# Patient Record
Sex: MALE | Race: WHITE | NOT HISPANIC OR LATINO | Employment: FULL TIME | URBAN - NONMETROPOLITAN AREA
[De-identification: names, ages, dates, MRNs, and addresses within clinical notes are randomized per-mention and may not be internally consistent; named-entity substitution may affect disease eponyms.]

---

## 2019-01-21 ENCOUNTER — OFFICE VISIT (OUTPATIENT)
Dept: CARDIOLOGY | Facility: CLINIC | Age: 44
End: 2019-01-21

## 2019-01-21 VITALS
HEART RATE: 72 BPM | SYSTOLIC BLOOD PRESSURE: 159 MMHG | RESPIRATION RATE: 16 BRPM | WEIGHT: 224 LBS | BODY MASS INDEX: 32.07 KG/M2 | HEIGHT: 70 IN | DIASTOLIC BLOOD PRESSURE: 93 MMHG

## 2019-01-21 DIAGNOSIS — R07.2 PRECORDIAL PAIN: Primary | ICD-10-CM

## 2019-01-21 DIAGNOSIS — I10 ESSENTIAL HYPERTENSION: ICD-10-CM

## 2019-01-21 PROCEDURE — 93000 ELECTROCARDIOGRAM COMPLETE: CPT | Performed by: INTERNAL MEDICINE

## 2019-01-21 PROCEDURE — 99204 OFFICE O/P NEW MOD 45 MIN: CPT | Performed by: INTERNAL MEDICINE

## 2019-01-21 RX ORDER — AMLODIPINE BESYLATE 5 MG/1
5 TABLET ORAL DAILY
Qty: 30 TABLET | Refills: 3 | Status: SHIPPED | OUTPATIENT
Start: 2019-01-21 | End: 2019-02-21 | Stop reason: SINTOL

## 2019-01-21 RX ORDER — NITROGLYCERIN 0.4 MG/1
TABLET SUBLINGUAL
Qty: 25 TABLET | Refills: 3 | Status: SHIPPED | OUTPATIENT
Start: 2019-01-21 | End: 2020-08-24 | Stop reason: SDUPTHER

## 2019-01-21 NOTE — PROGRESS NOTES
"Aamir Wilder DO  Sahil Barnes  1975 01/21/2019    Patient Active Problem List   Diagnosis   • Precordial pain   • Essential hypertension       Dear  :    Cinthya Barnes is a 43 y.o. male with the problems as listed above, presents    Chief complaint: Recurrent chest pains and left arm pains.    History of Present Illness: Mr. Barnes is a pleasant 42-year-old  male with no history of known coronary artery disease but has a positive family history with his father having had coronary artery disease at relatively young age, has hypertension but apparently not taking medications, nonsmoker nondiabetic, presents with complains of having recurrent chest pains over the last month or so. About a month ago he developed left upper chest pain which radiated to his left arm with severe pain in his left arm and felt like \"my arm is going to come off\".  This apparently lasted for about 30 minutes and was associated with profuse sweating.  Then it resolved spontaneously.  He did not seek any medical attention at that time.  Since then he has had couple of episodes of less intense pain and all these episodes occurred at night when he was sleeping.  He denies any chest painor arm pains during the daytime when he is working.  He works as a .  He denies any significant dyspnea, PND, orthopnea or pedal edema.  He has history of diverticulitis with some intermittent lower GI bleeding.    Cardiac risk factors:Positive family Hx. of premature athersclerotivc disease. and Age and male gender.    No Known Allergies:      Current Outpatient Medications:   •  amLODIPine (NORVASC) 5 MG tablet, Take 1 tablet by mouth Daily., Disp: 30 tablet, Rfl: 3  •  aspirin 81 MG tablet, Take 1 tablet by mouth Daily., Disp: 30 tablet, Rfl: 3  •  metoprolol tartrate (LOPRESSOR) 25 MG tablet, Take 1 tablet by mouth 2 (Two) Times a Day., Disp: 60 tablet, Rfl: 3  •  nitroglycerin (NITROSTAT) 0.4 MG SL tablet, 1 " "under the tongue as needed for angina, may repeat q5mins for up three doses, Disp: 25 tablet, Rfl: 3    Past Medical History:   Diagnosis Date   • Hypertension      Past Surgical History:   Procedure Laterality Date   • KNEE SURGERY       Family History   Problem Relation Age of Onset   • Heart attack Father    • Heart disease Father      Social History     Tobacco Use   • Smoking status: Never Smoker   • Smokeless tobacco: Current User     Types: Chew   Substance Use Topics   • Alcohol use: No     Frequency: Never   • Drug use: No       Review of Systems   Constitution: Positive for weight gain.   HENT: Positive for hearing loss.    Cardiovascular: Positive for chest pain and palpitations. Negative for leg swelling.   Respiratory: Negative for shortness of breath.    Skin: Positive for dry skin, itching and rash.   Musculoskeletal: Positive for joint pain.   Gastrointestinal: Positive for abdominal pain, constipation, diarrhea and hematochezia.        Diverticulitis   Genitourinary:        Hx renal stones   Psychiatric/Behavioral: The patient has insomnia.        Objective   Blood pressure 159/93, pulse 72, resp. rate 16, height 177.8 cm (70\"), weight 102 kg (224 lb).  Body mass index is 32.14 kg/m².        Physical Exam   Constitutional: He is oriented to person, place, and time. He appears well-developed and well-nourished.   HENT:   Mouth/Throat: Oropharynx is clear and moist.   Eyes: EOM are normal. Pupils are equal, round, and reactive to light.   Neck: Neck supple. No JVD present. No tracheal deviation present. No thyromegaly present.   Cardiovascular: Normal rate, regular rhythm, S1 normal and S2 normal. Exam reveals no gallop, no S3, no S4 and no friction rub.   No murmur heard.  Pulses:       Dorsalis pedis pulses are 1+ on the right side, and 1+ on the left side.        Posterior tibial pulses are 2+ on the right side, and 2+ on the left side.   Pulmonary/Chest: Effort normal and breath sounds normal. "   Abdominal: Soft. Bowel sounds are normal. He exhibits no mass. There is no tenderness.   Musculoskeletal: Normal range of motion. He exhibits no edema.   Lymphadenopathy:     He has no cervical adenopathy.   Neurological: He is alert and oriented to person, place, and time.   Skin: Skin is warm and dry. No rash noted.   Psychiatric: He has a normal mood and affect.       Lab Results   Component Value Date     12/21/2016    K 3.9 12/21/2016     12/21/2016    CO2 29.5 12/21/2016    BUN 6 (L) 12/21/2016    CREATININE 0.85 12/21/2016    GLUCOSE 89 12/21/2016    CALCIUM 9.2 12/21/2016    AST 15 12/21/2016    ALT 30 12/21/2016    ALKPHOS 87 12/21/2016     No results found for: CKTOTAL  Lab Results   Component Value Date    WBC 9.48 12/21/2016    HGB 15.5 12/21/2016    HCT 45.7 12/21/2016     12/21/2016           ECG 12 Lead  Date/Time: 1/21/2019 10:33 AM  Performed by: Collin Cook MD  Authorized by: Collin Cook MD   Rhythm: sinus rhythm  BPM: 71  Conduction: conduction normal  ST Segments: ST segments normal  T Waves: T waves normal  Clinical impression: normal ECG              Assessment/Plan    Diagnosis Plan   1. Precordial pain  Adult Stress Echo   2. Essential hypertension with elevated blood pressures.         Recommendations:  1. Add aspirin 81 mg daily and metoprolol 25 mg by mouth twice a day.  2. Add amlodipine 5 mg daily in the evening.  3. Evaluate his chest pains further with a stress echo study.    Return in about 3 weeks (around 2/11/2019).    As always,  Aamir   I appreciate very much the opportunity to participate in the cardiovascular care of your patients. Please do not hesitate to call me with any questions with regards to Sahil Barnes's evaluation and management.       With Best Regards,        Collin Cook MD, [facility]    Dragon disclaimer:  Much of this encounter note is an electronic transcription/translation of spoken language to printed text. The electronic  translation of spoken language may permit erroneous, or at times, nonsensical words or phrases to be inadvertently transcribed; Although I have reviewed the note for such errors, some may still exist.

## 2019-01-29 ENCOUNTER — HOSPITAL ENCOUNTER (OUTPATIENT)
Dept: CARDIOLOGY | Facility: HOSPITAL | Age: 44
Discharge: HOME | End: 2019-01-29
Attending: INTERNAL MEDICINE | Admitting: INTERNAL MEDICINE

## 2019-01-29 DIAGNOSIS — R07.2 PRECORDIAL PAIN: ICD-10-CM

## 2019-01-29 PROCEDURE — 93018 CV STRESS TEST I&R ONLY: CPT | Performed by: INTERNAL MEDICINE

## 2019-01-29 PROCEDURE — 93350 STRESS TTE ONLY: CPT | Performed by: INTERNAL MEDICINE

## 2019-01-29 PROCEDURE — 93320 DOPPLER ECHO COMPLETE: CPT

## 2019-01-29 PROCEDURE — 93325 DOPPLER ECHO COLOR FLOW MAPG: CPT | Performed by: INTERNAL MEDICINE

## 2019-01-29 PROCEDURE — 93351 STRESS TTE COMPLETE: CPT

## 2019-01-29 PROCEDURE — 93325 DOPPLER ECHO COLOR FLOW MAPG: CPT

## 2019-01-29 PROCEDURE — 93320 DOPPLER ECHO COMPLETE: CPT | Performed by: INTERNAL MEDICINE

## 2019-01-30 LAB
BH CV ECHO MEAS - % IVS THICK: 30.3 %
BH CV ECHO MEAS - % LVPW THICK: 74.3 %
BH CV ECHO MEAS - ACS: 2.2 CM
BH CV ECHO MEAS - AO MAX PG: 7.1 MMHG
BH CV ECHO MEAS - AO MEAN PG: 3.1 MMHG
BH CV ECHO MEAS - AO ROOT AREA: 6.9 CM^2
BH CV ECHO MEAS - AO ROOT DIAM: 3 CM
BH CV ECHO MEAS - AO V2 MAX: 133 CM/SEC
BH CV ECHO MEAS - AO V2 MEAN: 80.3 CM/SEC
BH CV ECHO MEAS - AO V2 VTI: 29.7 CM
BH CV ECHO MEAS - EDV(CUBED): 103.8 ML
BH CV ECHO MEAS - EDV(MOD-SP4): 80 ML
BH CV ECHO MEAS - EDV(TEICH): 102.4 ML
BH CV ECHO MEAS - EF(CUBED): 62.5 %
BH CV ECHO MEAS - EF(MOD-SP4): 56.3 %
BH CV ECHO MEAS - EF(TEICH): 54 %
BH CV ECHO MEAS - ESV(CUBED): 39 ML
BH CV ECHO MEAS - ESV(MOD-SP4): 35 ML
BH CV ECHO MEAS - ESV(TEICH): 47.1 ML
BH CV ECHO MEAS - FS: 27.9 %
BH CV ECHO MEAS - IVS/LVPW: 1.1
BH CV ECHO MEAS - IVSD: 1 CM
BH CV ECHO MEAS - IVSS: 1.3 CM
BH CV ECHO MEAS - LA DIMENSION: 3.8 CM
BH CV ECHO MEAS - LA/AO: 1.3
BH CV ECHO MEAS - LV MASS(C)D: 160 GRAMS
BH CV ECHO MEAS - LV MASS(C)S: 182.2 GRAMS
BH CV ECHO MEAS - LVIDD: 4.7 CM
BH CV ECHO MEAS - LVIDS: 3.4 CM
BH CV ECHO MEAS - LVLD AP4: 7.7 CM
BH CV ECHO MEAS - LVLS AP4: 6.5 CM
BH CV ECHO MEAS - LVOT AREA (M): 3.5 CM^2
BH CV ECHO MEAS - LVOT AREA: 3.4 CM^2
BH CV ECHO MEAS - LVOT DIAM: 2.1 CM
BH CV ECHO MEAS - LVPWD: 0.95 CM
BH CV ECHO MEAS - LVPWS: 1.6 CM
BH CV ECHO MEAS - MV A MAX VEL: 50.8 CM/SEC
BH CV ECHO MEAS - MV E MAX VEL: 80.9 CM/SEC
BH CV ECHO MEAS - MV E/A: 1.6
BH CV ECHO MEAS - PA ACC SLOPE: 1590 CM/SEC^2
BH CV ECHO MEAS - PA ACC TIME: 0.08 SEC
BH CV ECHO MEAS - PA PR(ACCEL): 41 MMHG
BH CV ECHO MEAS - RAP SYSTOLE: 10 MMHG
BH CV ECHO MEAS - RVSP: 36.1 MMHG
BH CV ECHO MEAS - SV(AO): 203.4 ML
BH CV ECHO MEAS - SV(CUBED): 64.9 ML
BH CV ECHO MEAS - SV(MOD-SP4): 45 ML
BH CV ECHO MEAS - SV(TEICH): 55.3 ML
BH CV ECHO MEAS - TR MAX VEL: 255.6 CM/SEC
BH CV STRESS BP STAGE 1: NORMAL
BH CV STRESS BP STAGE 2: NORMAL
BH CV STRESS BP STAGE 3: NORMAL
BH CV STRESS BP STAGE 4: NORMAL
BH CV STRESS DURATION MIN STAGE 1: 3
BH CV STRESS DURATION MIN STAGE 2: 3
BH CV STRESS DURATION MIN STAGE 3: 3
BH CV STRESS DURATION MIN STAGE 4: 3
BH CV STRESS DURATION SEC STAGE 1: 0
BH CV STRESS DURATION SEC STAGE 2: 0
BH CV STRESS DURATION SEC STAGE 3: 0
BH CV STRESS DURATION SEC STAGE 4: 0
BH CV STRESS ECHO POST STRESS EJECTION FRACTION EF: 70 %
BH CV STRESS GRADE STAGE 1: 10
BH CV STRESS GRADE STAGE 2: 12
BH CV STRESS GRADE STAGE 3: 14
BH CV STRESS GRADE STAGE 4: 16
BH CV STRESS HR STAGE 1: 106
BH CV STRESS HR STAGE 2: 120
BH CV STRESS HR STAGE 3: 138
BH CV STRESS HR STAGE 4: 159
BH CV STRESS METS STAGE 1: 5
BH CV STRESS METS STAGE 2: 7.5
BH CV STRESS METS STAGE 3: 10
BH CV STRESS METS STAGE 4: 13.5
BH CV STRESS PROTOCOL 1: NORMAL
BH CV STRESS RECOVERY BP: NORMAL MMHG
BH CV STRESS RECOVERY HR: 89 BPM
BH CV STRESS SPEED STAGE 1: 1.7
BH CV STRESS SPEED STAGE 2: 2.5
BH CV STRESS SPEED STAGE 3: 3.4
BH CV STRESS SPEED STAGE 4: 4.2
BH CV STRESS STAGE 1: 1
BH CV STRESS STAGE 2: 2
BH CV STRESS STAGE 3: 3
BH CV STRESS STAGE 4: 4
MAXIMAL PREDICTED HEART RATE: 175 BPM
PERCENT MAX PREDICTED HR: 90.86 %
STRESS BASELINE BP: NORMAL MMHG
STRESS BASELINE HR: 76 BPM
STRESS PERCENT HR: 107 %
STRESS POST ESTIMATED WORKLOAD: 12.8 METS
STRESS POST EXERCISE DUR MIN: 10 MIN
STRESS POST EXERCISE DUR SEC: 0 SEC
STRESS POST PEAK BP: NORMAL MMHG
STRESS POST PEAK HR: 159 BPM
STRESS TARGET HR: 149 BPM

## 2019-02-21 ENCOUNTER — OFFICE VISIT (OUTPATIENT)
Dept: CARDIOLOGY | Facility: CLINIC | Age: 44
End: 2019-02-21

## 2019-02-21 VITALS
DIASTOLIC BLOOD PRESSURE: 85 MMHG | HEIGHT: 70 IN | BODY MASS INDEX: 32.93 KG/M2 | WEIGHT: 230 LBS | OXYGEN SATURATION: 98 % | HEART RATE: 76 BPM | SYSTOLIC BLOOD PRESSURE: 145 MMHG | RESPIRATION RATE: 18 BRPM

## 2019-02-21 DIAGNOSIS — I10 ESSENTIAL HYPERTENSION: Primary | ICD-10-CM

## 2019-02-21 PROCEDURE — 99213 OFFICE O/P EST LOW 20 MIN: CPT | Performed by: PHYSICIAN ASSISTANT

## 2019-02-21 NOTE — PROGRESS NOTES
Aamir Wilder DO  Sahil Barnes  1975 02/21/2019    Patient Active Problem List   Diagnosis   • Precordial pain   • Essential hypertension       Dear Aamir Wilder DO:    Subjective       History of Present Illness:    Chief Complaint   Patient presents with   • Review test results     Stress test done on 1/21/2019       Sahil Barnes is a pleasant 43 y.o. male with a past medical history significant for no history of known coronary artery disease but has a positive family history with his father having had coronary artery disease at relatively young age, has hypertension but apparently not taking medications, nonsmoker nondiabetic. He comes in today to discuss results of stress echo.     Patient states since starting on amlodipine and Lopressor he has been having leg pains with leg swelling.  His stress echo revealed normal pump function with no significant valvular disease and stress portion showed no signs of ischemia.    He does report of this test was performed because of chest pains and is having in his left shoulder that he felt like was getting hold off however since being initiated on these medications he has not had any more episodes of the chest pain.        Stress echo on 1/30/2019  Interpretation Summary   Echocardiogram Findings:   Normal left ventricular cavity size and wall thickness noted. All left ventricular wall segments contract normally.   Estimated EF appears to be in the range of 61 - 65%   The aortic valve is structurally normal. No aortic valve regurgitation is present. No aortic valve stenosis is present.   The mitral valve is normal in structure. Mild mitral valve regurgitation is present. No significant mitral valve stenosis is present.   The tricuspid valve is normal. No tricuspid valve stenosis is present. Mild tricuspid valve regurgitation is present. Estimated right ventricular systolic pressure from tricuspid regurgitation is normal (<35 mmHg).   There is no evidence of  pericardial effusion.   Stress Procedure:   A stress test was performed following the Allan protocol.   Exercise duration (min) 10 min Exercise duration (sec) 0 sec Estimated workload 12.8 METS   Baseline Vitals Baseline HR 76 bpm Baseline /96 mmHg Peak Stress Vitals Peak  bpm Peak /68 mmHg Recovery Vitals Recovery HR 89 bpm Recovery /64 mmHg Exercise Data Target HR (85%) 149 bpm Max. Pred. HR (100%) 175 bpm Percent Max Pred HR 90.86 %   There was no ST segment deviation noted during stress.   Arrhythmias during stress: occasional PVCs.   Normal ECG stress ECG interpretation.   Stress Echo Findings:   Segment augmentation had a normal response to stress.   Cavity size behaved normally in response to stress. Left ventricular function is normal. Septal wall motion is normal.   Post stress images with adequate visualization of myocardium to assess for ischemia   Normal stress echo with no significant echocardiographic evidence for myocardial ischemia.             No Known Allergies:      Current Outpatient Medications:   •  aspirin 81 MG tablet, Take 1 tablet by mouth Daily., Disp: 30 tablet, Rfl: 3  •  metoprolol tartrate (LOPRESSOR) 25 MG tablet, Take 1 tablet by mouth 2 (Two) Times a Day., Disp: 60 tablet, Rfl: 3  •  nitroglycerin (NITROSTAT) 0.4 MG SL tablet, 1 under the tongue as needed for angina, may repeat q5mins for up three doses, Disp: 25 tablet, Rfl: 3      The following portions of the patient's history were reviewed and updated as appropriate: allergies, current medications, past family history, past medical history, past social history, past surgical history and problem list.    Social History     Tobacco Use   • Smoking status: Never Smoker   • Smokeless tobacco: Current User     Types: Chew   Substance Use Topics   • Alcohol use: No     Frequency: Never   • Drug use: No       Review of Systems   Constitution: Negative for weakness and malaise/fatigue.   Cardiovascular: Negative  "for chest pain, dyspnea on exertion and irregular heartbeat.   Respiratory: Negative for cough and shortness of breath.    Hematologic/Lymphatic: Negative for bleeding problem. Does not bruise/bleed easily.   Gastrointestinal: Negative for nausea and vomiting.       Objective   Vitals:    02/21/19 0856   BP: 145/85   BP Location: Left arm   Patient Position: Sitting   Cuff Size: Adult   Pulse: 76   Resp: 18   SpO2: 98%   Weight: 104 kg (230 lb)   Height: 177.8 cm (70\")     Body mass index is 33 kg/m².        Physical Exam   Constitutional: He is oriented to person, place, and time. He appears well-developed and well-nourished. No distress.   HENT:   Head: Normocephalic and atraumatic.   Cardiovascular: Normal rate, regular rhythm, normal heart sounds and intact distal pulses.   Pulmonary/Chest: Effort normal and breath sounds normal. No respiratory distress.   Musculoskeletal: He exhibits no edema.   Neurological: He is alert and oriented to person, place, and time.   Skin: He is not diaphoretic.       Lab Results   Component Value Date     12/21/2016    K 3.9 12/21/2016     12/21/2016    CO2 29.5 12/21/2016    BUN 6 (L) 12/21/2016    CREATININE 0.85 12/21/2016    GLUCOSE 89 12/21/2016    CALCIUM 9.2 12/21/2016    AST 15 12/21/2016    ALT 30 12/21/2016    ALKPHOS 87 12/21/2016     No results found for: CKTOTAL  Lab Results   Component Value Date    WBC 9.48 12/21/2016    HGB 15.5 12/21/2016    HCT 45.7 12/21/2016     12/21/2016     No results found for: INR  No results found for: MG  No results found for: TSH, PSA, CHLPL, TRIG, HDL, LDL   No results found for: BNP    During this visit the following were done:  Labs Reviewed [x]    Labs Ordered []    Radiology Reports Reviewed [x]    Radiology Ordered []    PCP Records Reviewed []    Referring Provider Records Reviewed []    ER Records Reviewed []    Hospital Records Reviewed []    History Obtained From Family []    Radiology Images Reviewed []  "   Other Reviewed []    Records Requested []       Procedures      Assessment/Plan    Diagnosis Plan   1. Essential hypertension              Recommendations:  1. Will stop his amlodipine due to edema and leg pain. Advised to keep an eye on his blood pressure.   2. Reviewed his stress echo with him.   3. Continue lopressor.  4. Follow up in 3 months.     Return in about 3 months (around 5/21/2019).    As always, I appreciate very much the opportunity to participate in the cardiovascular care of your patients.      With Best Regards,    RORY Ny disclaimer:  Much of this encounter note is an electronic transcription/translation of spoken language to printed text. The electronic translation of spoken language may permit erroneous, or at times, nonsensical words or phrases to be inadvertently transcribed; Although I have reviewed the note for such errors, some may still exist.

## 2019-02-21 NOTE — PROGRESS NOTES
"Aamir Wilder DO  Sahil Barnes  1975 02/21/2019    Patient Active Problem List   Diagnosis   • Precordial pain   • Essential hypertension       Dear Aamir Wilder DO:    Subjective       History of Present Illness:    Chief Complaint   Patient presents with   • Review test results     Stress test done on 1/21/2019       Sahil Barnes is a pleasant 43 y.o. male with a past medical history significant for      No Known Allergies:      Current Outpatient Medications:   •  amLODIPine (NORVASC) 5 MG tablet, Take 1 tablet by mouth Daily., Disp: 30 tablet, Rfl: 3  •  aspirin 81 MG tablet, Take 1 tablet by mouth Daily., Disp: 30 tablet, Rfl: 3  •  metoprolol tartrate (LOPRESSOR) 25 MG tablet, Take 1 tablet by mouth 2 (Two) Times a Day., Disp: 60 tablet, Rfl: 3  •  nitroglycerin (NITROSTAT) 0.4 MG SL tablet, 1 under the tongue as needed for angina, may repeat q5mins for up three doses, Disp: 25 tablet, Rfl: 3      The following portions of the patient's history were reviewed and updated as appropriate: allergies, current medications, past family history, past medical history, past social history, past surgical history and problem list.    Social History     Tobacco Use   • Smoking status: Never Smoker   • Smokeless tobacco: Current User     Types: Chew   Substance Use Topics   • Alcohol use: No     Frequency: Never   • Drug use: No       ROS    Objective   Vitals:    02/21/19 0856   BP: 145/85   BP Location: Left arm   Patient Position: Sitting   Cuff Size: Adult   Pulse: 76   Resp: 18   SpO2: 98%   Weight: 104 kg (230 lb)   Height: 177.8 cm (70\")     Body mass index is 33 kg/m².        Physical Exam    Lab Results   Component Value Date     12/21/2016    K 3.9 12/21/2016     12/21/2016    CO2 29.5 12/21/2016    BUN 6 (L) 12/21/2016    CREATININE 0.85 12/21/2016    GLUCOSE 89 12/21/2016    CALCIUM 9.2 12/21/2016    AST 15 12/21/2016    ALT 30 12/21/2016    ALKPHOS 87 12/21/2016     No results found " for: CKTOTAL  Lab Results   Component Value Date    WBC 9.48 12/21/2016    HGB 15.5 12/21/2016    HCT 45.7 12/21/2016     12/21/2016     No results found for: INR  No results found for: MG  No results found for: TSH, PSA, CHLPL, TRIG, HDL, LDL   No results found for: BNP    During this visit the following were done:  Labs Reviewed [x]    Labs Ordered []    Radiology Reports Reviewed [x]    Radiology Ordered []    PCP Records Reviewed []    Referring Provider Records Reviewed []    ER Records Reviewed []    Hospital Records Reviewed []    History Obtained From Family []    Radiology Images Reviewed []    Other Reviewed []    Records Requested []       Procedures      Assessment/Plan    Diagnosis Plan   1. Essential hypertension                  Recommendations:  1.     Patient's Body mass index is 33 kg/m². BMI is {BMI range:98025}.       No Follow-up on file.    As always, I appreciate very much the opportunity to participate in the cardiovascular care of your patients.      With Best Regards,    RORY Ny disclaimer:  Much of this encounter note is an electronic transcription/translation of spoken language to printed text. The electronic translation of spoken language may permit erroneous, or at times, nonsensical words or phrases to be inadvertently transcribed; Although I have reviewed the note for such errors, some may still exist.

## 2019-06-26 ENCOUNTER — OFFICE VISIT (OUTPATIENT)
Dept: CARDIOLOGY | Facility: CLINIC | Age: 44
End: 2019-06-26

## 2019-06-26 VITALS
SYSTOLIC BLOOD PRESSURE: 145 MMHG | HEIGHT: 70 IN | BODY MASS INDEX: 33.21 KG/M2 | DIASTOLIC BLOOD PRESSURE: 91 MMHG | WEIGHT: 232 LBS | OXYGEN SATURATION: 97 % | HEART RATE: 65 BPM

## 2019-06-26 DIAGNOSIS — I10 ESSENTIAL HYPERTENSION: Primary | ICD-10-CM

## 2019-06-26 PROCEDURE — 99213 OFFICE O/P EST LOW 20 MIN: CPT | Performed by: PHYSICIAN ASSISTANT

## 2019-06-26 NOTE — PROGRESS NOTES
Aamir Wilder DO  Sahil Barnes  1975 06/26/2019    Patient Active Problem List   Diagnosis   • Precordial pain   • Essential hypertension       Dear Aamir Wilder DO:    Subjective     History of Present Illness:    Chief Complaint   Patient presents with   • Hypertension     Follow up, states B/P avtg 130's/70's   • Med Management     past wk traveling only took half dosage of Lopressor, d/c ASA due to side effect, pt feeling better       Sahil Barnes is a pleasant 43 y.o. male with a past medical history significant for history of precordial pain with a subsequent stress test being unremarkable and essential hypertension. He comes in for routine cardilogy follow up.     His blood pressure is slightly elevated today but he reports he checks it at home and it runs in the 130s mmhg systolic and he states that he ran our of his medication a few days ago and hasn't take any since. Patient denies any chest pain, shortness of breath, palpitations, dizziness, syncope or near syncope.     No Known Allergies:      Current Outpatient Medications:   •  metoprolol tartrate (LOPRESSOR) 25 MG tablet, Take 1 tablet by mouth 2 (Two) Times a Day., Disp: 120 tablet, Rfl: 6  •  nitroglycerin (NITROSTAT) 0.4 MG SL tablet, 1 under the tongue as needed for angina, may repeat q5mins for up three doses, Disp: 25 tablet, Rfl: 3    The following portions of the patient's history were reviewed and updated as appropriate: allergies, current medications, past family history, past medical history, past social history, past surgical history and problem list.    Social History     Tobacco Use   • Smoking status: Never Smoker   • Smokeless tobacco: Current User     Types: Chew   Substance Use Topics   • Alcohol use: No     Frequency: Never   • Drug use: No       Review of Systems   Constitution: Negative for weakness and malaise/fatigue.   Cardiovascular: Negative for chest pain, dyspnea on exertion and irregular heartbeat.  "  Respiratory: Negative for cough and shortness of breath.    Hematologic/Lymphatic: Negative for bleeding problem. Does not bruise/bleed easily.   Gastrointestinal: Negative for nausea and vomiting.     Objective   Vitals:    06/26/19 0945   BP: 145/91   BP Location: Left arm   Patient Position: Sitting   Cuff Size: Adult   Pulse: 65   SpO2: 97%   Weight: 105 kg (232 lb)   Height: 177.8 cm (70\")     Body mass index is 33.29 kg/m².    Physical Exam   Constitutional: He is oriented to person, place, and time. He appears well-developed and well-nourished. No distress.   HENT:   Head: Normocephalic and atraumatic.   Cardiovascular: Normal rate, regular rhythm and normal heart sounds.   Pulmonary/Chest: Effort normal and breath sounds normal. No respiratory distress.   Musculoskeletal: He exhibits no edema.   Neurological: He is alert and oriented to person, place, and time.   Skin: He is not diaphoretic.       Lab Results   Component Value Date     12/21/2016    K 3.9 12/21/2016     12/21/2016    CO2 29.5 12/21/2016    BUN 6 (L) 12/21/2016    CREATININE 0.85 12/21/2016    GLUCOSE 89 12/21/2016    CALCIUM 9.2 12/21/2016    AST 15 12/21/2016    ALT 30 12/21/2016    ALKPHOS 87 12/21/2016     No results found for: CKTOTAL  Lab Results   Component Value Date    WBC 9.48 12/21/2016    HGB 15.5 12/21/2016    HCT 45.7 12/21/2016     12/21/2016     During this visit the following were done:  Labs Reviewed [x]    Labs Ordered []    Radiology Reports Reviewed [x]    Radiology Ordered []    PCP Records Reviewed []    Referring Provider Records Reviewed []    ER Records Reviewed []    Hospital Records Reviewed []    History Obtained From Family []    Radiology Images Reviewed []    Other Reviewed []    Records Requested []       Procedures    Assessment/Plan    Diagnosis Plan   1. Essential hypertension  Lipid Panel    Comprehensive Metabolic Panel            Recommendations:  1. Patient is doing well from " cardiac standpoint. Since blood pressure is well controlled at home will not make adjustments today. Continue metoprolol.     Return in about 9 months (around 3/26/2020).    As always, I appreciate very much the opportunity to participate in the cardiovascular care of your patients.      With Best Regards,    Sundeep Caruso PA-C

## 2020-08-20 ENCOUNTER — TELEPHONE (OUTPATIENT)
Dept: CARDIOLOGY | Facility: CLINIC | Age: 45
End: 2020-08-20

## 2020-08-20 RX ORDER — NITROGLYCERIN 0.4 MG/1
TABLET SUBLINGUAL
Qty: 25 TABLET | Refills: 3 | OUTPATIENT
Start: 2020-08-20

## 2020-08-24 ENCOUNTER — OFFICE VISIT (OUTPATIENT)
Dept: CARDIOLOGY | Facility: CLINIC | Age: 45
End: 2020-08-24

## 2020-08-24 VITALS
SYSTOLIC BLOOD PRESSURE: 128 MMHG | DIASTOLIC BLOOD PRESSURE: 82 MMHG | HEART RATE: 57 BPM | WEIGHT: 233 LBS | TEMPERATURE: 98 F | HEIGHT: 70 IN | BODY MASS INDEX: 33.36 KG/M2 | RESPIRATION RATE: 16 BRPM

## 2020-08-24 DIAGNOSIS — I10 ESSENTIAL HYPERTENSION: Primary | ICD-10-CM

## 2020-08-24 PROCEDURE — 99213 OFFICE O/P EST LOW 20 MIN: CPT | Performed by: PHYSICIAN ASSISTANT

## 2020-08-24 PROCEDURE — 93000 ELECTROCARDIOGRAM COMPLETE: CPT | Performed by: PHYSICIAN ASSISTANT

## 2020-08-24 RX ORDER — NITROGLYCERIN 0.4 MG/1
TABLET SUBLINGUAL
Qty: 25 TABLET | Refills: 3 | Status: SHIPPED | OUTPATIENT
Start: 2020-08-24

## 2020-08-24 NOTE — PROGRESS NOTES
Aamir Wilder DO  Sahil Barnes  1975 08/24/2020    Patient Active Problem List   Diagnosis   • Precordial pain   • Essential hypertension       Dear Aamir Wilder DO:    Subjective     History of Present Illness:    Chief Complaint   Patient presents with   • Hypertension     1+ year follow   • Palpitations     some   • Med Management     verbal       Sahil Barnes is a pleasant 44 y.o. male with a past medical history significant for precordial pain with a subsequent stress test being unremarkable and essential hypertension. He comes in for routine cardilogy follow up.     Patient reports that he has been doing well. Patient denies any chest pain, shortness of breath, dizziness, syncope or near syncope. He also states that his blood pressure has been doing well too. He does report some palpitations but reports are mild and do not affect ADLs.     No Known Allergies:      Current Outpatient Medications:   •  metoprolol tartrate (LOPRESSOR) 25 MG tablet, Take 1 tablet by mouth 2 (Two) Times a Day., Disp: 180 tablet, Rfl: 2  •  nitroglycerin (NITROSTAT) 0.4 MG SL tablet, 1 under the tongue as needed for angina, may repeat q5mins for up three doses, Disp: 25 tablet, Rfl: 3    The following portions of the patient's history were reviewed and updated as appropriate: allergies, current medications, past family history, past medical history, past social history, past surgical history and problem list.    Social History     Tobacco Use   • Smoking status: Never Smoker   • Smokeless tobacco: Current User     Types: Chew   Substance Use Topics   • Alcohol use: No     Frequency: Never   • Drug use: No       Review of Systems   Constitution: Negative for malaise/fatigue.   Cardiovascular: Negative for chest pain, dyspnea on exertion, irregular heartbeat, leg swelling and palpitations.   Respiratory: Negative for cough and shortness of breath.    Hematologic/Lymphatic: Negative for bleeding problem. Does not  "bruise/bleed easily.   Gastrointestinal: Negative for nausea and vomiting.   Neurological: Negative for weakness.       Objective   Vitals:    08/24/20 0821   BP: 128/82   Pulse: 57   Resp: 16   Temp: 98 °F (36.7 °C)   Weight: 106 kg (233 lb)   Height: 177.8 cm (70\")     Body mass index is 33.43 kg/m².    Physical Exam   Constitutional: He is oriented to person, place, and time. He appears well-developed and well-nourished. No distress.   HENT:   Head: Normocephalic and atraumatic.   Cardiovascular: Normal rate, regular rhythm and normal heart sounds.   Pulmonary/Chest: Effort normal and breath sounds normal. No respiratory distress.   Musculoskeletal: He exhibits no edema.   Neurological: He is alert and oriented to person, place, and time.   Skin: He is not diaphoretic.       Lab Results   Component Value Date     12/21/2016    K 3.9 12/21/2016     12/21/2016    CO2 29.5 12/21/2016    BUN 6 (L) 12/21/2016    CREATININE 0.85 12/21/2016    GLUCOSE 89 12/21/2016    CALCIUM 9.2 12/21/2016    AST 15 12/21/2016    ALT 30 12/21/2016    ALKPHOS 87 12/21/2016     No results found for: CKTOTAL  Lab Results   Component Value Date    WBC 9.48 12/21/2016    HGB 15.5 12/21/2016    HCT 45.7 12/21/2016     12/21/2016     No results found for: INR  No results found for: MG  No results found for: TSH, PSA, CHLPL, TRIG, HDL, LDL   No results found for: BNP    During this visit the following were done:  Labs Reviewed [x]    Labs Ordered []    Radiology Reports Reviewed [x]    Radiology Ordered []    PCP Records Reviewed []    Referring Provider Records Reviewed []    ER Records Reviewed []    Hospital Records Reviewed []    History Obtained From Family []    Radiology Images Reviewed []    Other Reviewed []    Records Requested []         ECG 12 Lead  Date/Time: 8/24/2020 8:23 AM  Performed by: Sundeep Caruso PA-C  Authorized by: Sundeep Caruso PA-C   Comparison: compared with previous ECG   Similar to " previous ECG  Rhythm: sinus rhythm  ST Segments: ST segments normal    Clinical impression: normal ECG            Assessment/Plan    Diagnosis Plan   1. Essential hypertension              Recommendations:  1. Mr. Barnes appears to be doing well from cardiac standpoint. He is asymptomatic with good blood pressure control.  Will continue metoprolol.       Return in about 1 year (around 8/24/2021).    As always, I appreciate very much the opportunity to participate in the cardiovascular care of your patients.      With Best Regards,    Sundeep Caruso PA-C

## 2021-07-21 ENCOUNTER — HOSPITAL ENCOUNTER (OUTPATIENT)
Dept: ULTRASOUND IMAGING | Facility: HOSPITAL | Age: 46
Discharge: HOME | End: 2021-07-21
Admitting: FAMILY MEDICINE

## 2021-07-21 ENCOUNTER — TRANSCRIBE ORDERS (OUTPATIENT)
Dept: ADMINISTRATIVE | Facility: HOSPITAL | Age: 46
End: 2021-07-21

## 2021-07-21 DIAGNOSIS — R10.11 ABDOMINAL PAIN, RIGHT UPPER QUADRANT: ICD-10-CM

## 2021-07-21 DIAGNOSIS — R10.11 ABDOMINAL PAIN, RIGHT UPPER QUADRANT: Primary | ICD-10-CM

## 2021-07-21 PROCEDURE — 76705 ECHO EXAM OF ABDOMEN: CPT

## 2021-07-21 PROCEDURE — 76705 ECHO EXAM OF ABDOMEN: CPT | Performed by: RADIOLOGY

## 2021-07-22 ENCOUNTER — HOSPITAL ENCOUNTER (EMERGENCY)
Facility: HOSPITAL | Age: 46
Discharge: HOME | End: 2021-07-22
Attending: STUDENT IN AN ORGANIZED HEALTH CARE EDUCATION/TRAINING PROGRAM | Admitting: STUDENT IN AN ORGANIZED HEALTH CARE EDUCATION/TRAINING PROGRAM

## 2021-07-22 ENCOUNTER — APPOINTMENT (OUTPATIENT)
Dept: CT IMAGING | Facility: HOSPITAL | Age: 46
End: 2021-07-22

## 2021-07-22 ENCOUNTER — HOSPITAL ENCOUNTER (EMERGENCY)
Facility: HOSPITAL | Age: 46
End: 2021-07-22

## 2021-07-22 VITALS
TEMPERATURE: 97.8 F | SYSTOLIC BLOOD PRESSURE: 127 MMHG | HEART RATE: 62 BPM | DIASTOLIC BLOOD PRESSURE: 77 MMHG | BODY MASS INDEX: 31.5 KG/M2 | OXYGEN SATURATION: 100 % | RESPIRATION RATE: 18 BRPM | HEIGHT: 70 IN | WEIGHT: 220 LBS

## 2021-07-22 DIAGNOSIS — R10.13 EPIGASTRIC ABDOMINAL PAIN: Primary | ICD-10-CM

## 2021-07-22 LAB
ALBUMIN SERPL-MCNC: 4.32 G/DL (ref 3.5–5.2)
ALBUMIN/GLOB SERPL: 1.3 G/DL
ALP SERPL-CCNC: 93 U/L (ref 39–117)
ALT SERPL W P-5'-P-CCNC: 42 U/L (ref 1–41)
ANION GAP SERPL CALCULATED.3IONS-SCNC: 8.4 MMOL/L (ref 5–15)
AST SERPL-CCNC: 23 U/L (ref 1–40)
BASOPHILS # BLD AUTO: 0.05 10*3/MM3 (ref 0–0.2)
BASOPHILS NFR BLD AUTO: 0.8 % (ref 0–1.5)
BILIRUB SERPL-MCNC: 0.4 MG/DL (ref 0–1.2)
BUN SERPL-MCNC: 9 MG/DL (ref 6–20)
BUN/CREAT SERPL: 10.7 (ref 7–25)
CALCIUM SPEC-SCNC: 9 MG/DL (ref 8.6–10.5)
CHLORIDE SERPL-SCNC: 104 MMOL/L (ref 98–107)
CO2 SERPL-SCNC: 27.6 MMOL/L (ref 22–29)
CREAT SERPL-MCNC: 0.84 MG/DL (ref 0.76–1.27)
CRP SERPL-MCNC: <0.3 MG/DL (ref 0–0.5)
DEPRECATED RDW RBC AUTO: 42.1 FL (ref 37–54)
EOSINOPHIL # BLD AUTO: 0.06 10*3/MM3 (ref 0–0.4)
EOSINOPHIL NFR BLD AUTO: 0.9 % (ref 0.3–6.2)
ERYTHROCYTE [DISTWIDTH] IN BLOOD BY AUTOMATED COUNT: 12.4 % (ref 12.3–15.4)
GFR SERPL CREATININE-BSD FRML MDRD: 98 ML/MIN/1.73
GLOBULIN UR ELPH-MCNC: 3.3 GM/DL
GLUCOSE SERPL-MCNC: 98 MG/DL (ref 65–99)
HCT VFR BLD AUTO: 46.2 % (ref 37.5–51)
HGB BLD-MCNC: 15.8 G/DL (ref 13–17.7)
IMM GRANULOCYTES # BLD AUTO: 0.01 10*3/MM3 (ref 0–0.05)
IMM GRANULOCYTES NFR BLD AUTO: 0.2 % (ref 0–0.5)
LIPASE SERPL-CCNC: 36 U/L (ref 13–60)
LYMPHOCYTES # BLD AUTO: 2.26 10*3/MM3 (ref 0.7–3.1)
LYMPHOCYTES NFR BLD AUTO: 35 % (ref 19.6–45.3)
MCH RBC QN AUTO: 31.7 PG (ref 26.6–33)
MCHC RBC AUTO-ENTMCNC: 34.2 G/DL (ref 31.5–35.7)
MCV RBC AUTO: 92.8 FL (ref 79–97)
MONOCYTES # BLD AUTO: 0.7 10*3/MM3 (ref 0.1–0.9)
MONOCYTES NFR BLD AUTO: 10.9 % (ref 5–12)
NEUTROPHILS NFR BLD AUTO: 3.37 10*3/MM3 (ref 1.7–7)
NEUTROPHILS NFR BLD AUTO: 52.2 % (ref 42.7–76)
NRBC BLD AUTO-RTO: 0 /100 WBC (ref 0–0.2)
PLATELET # BLD AUTO: 209 10*3/MM3 (ref 140–450)
PMV BLD AUTO: 10.1 FL (ref 6–12)
POTASSIUM SERPL-SCNC: 3.9 MMOL/L (ref 3.5–5.2)
PROT SERPL-MCNC: 7.6 G/DL (ref 6–8.5)
RBC # BLD AUTO: 4.98 10*6/MM3 (ref 4.14–5.8)
SODIUM SERPL-SCNC: 140 MMOL/L (ref 136–145)
WBC # BLD AUTO: 6.45 10*3/MM3 (ref 3.4–10.8)

## 2021-07-22 PROCEDURE — 83690 ASSAY OF LIPASE: CPT | Performed by: PHYSICIAN ASSISTANT

## 2021-07-22 PROCEDURE — 85025 COMPLETE CBC W/AUTO DIFF WBC: CPT | Performed by: PHYSICIAN ASSISTANT

## 2021-07-22 PROCEDURE — 25010000002 IOPAMIDOL 61 % SOLUTION: Performed by: STUDENT IN AN ORGANIZED HEALTH CARE EDUCATION/TRAINING PROGRAM

## 2021-07-22 PROCEDURE — 25010000002 ONDANSETRON PER 1 MG: Performed by: PHYSICIAN ASSISTANT

## 2021-07-22 PROCEDURE — 99283 EMERGENCY DEPT VISIT LOW MDM: CPT

## 2021-07-22 PROCEDURE — 80053 COMPREHEN METABOLIC PANEL: CPT | Performed by: PHYSICIAN ASSISTANT

## 2021-07-22 PROCEDURE — 96374 THER/PROPH/DIAG INJ IV PUSH: CPT

## 2021-07-22 PROCEDURE — 86140 C-REACTIVE PROTEIN: CPT | Performed by: PHYSICIAN ASSISTANT

## 2021-07-22 PROCEDURE — 74177 CT ABD & PELVIS W/CONTRAST: CPT

## 2021-07-22 RX ORDER — ONDANSETRON 2 MG/ML
4 INJECTION INTRAMUSCULAR; INTRAVENOUS ONCE
Status: COMPLETED | OUTPATIENT
Start: 2021-07-22 | End: 2021-07-22

## 2021-07-22 RX ORDER — ONDANSETRON 4 MG/1
4 TABLET, ORALLY DISINTEGRATING ORAL EVERY 6 HOURS PRN
Qty: 20 TABLET | Refills: 0 | Status: SHIPPED | OUTPATIENT
Start: 2021-07-22 | End: 2022-10-06

## 2021-07-22 RX ORDER — SODIUM CHLORIDE 0.9 % (FLUSH) 0.9 %
10 SYRINGE (ML) INJECTION AS NEEDED
Status: DISCONTINUED | OUTPATIENT
Start: 2021-07-22 | End: 2021-07-22 | Stop reason: HOSPADM

## 2021-07-22 RX ADMIN — ONDANSETRON 4 MG: 2 INJECTION INTRAMUSCULAR; INTRAVENOUS at 06:03

## 2021-07-22 RX ADMIN — IOPAMIDOL 100 ML: 612 INJECTION, SOLUTION INTRAVENOUS at 07:44

## 2021-07-22 NOTE — ED PROVIDER NOTES
Subjective     History provided by:  Patient  Abdominal Pain  Pain location:  Epigastric  Pain quality: aching    Pain radiates to:  Does not radiate  Pain severity:  Moderate  Onset quality:  Sudden  Duration:  6 hours  Timing:  Constant  Progression:  Worsening  Chronicity:  Recurrent  Relieved by:  Nothing  Associated symptoms: nausea    Associated symptoms: no chest pain, no dysuria and no fever        Review of Systems   Constitutional: Negative.  Negative for fever.   HENT: Negative.    Respiratory: Negative.    Cardiovascular: Negative.  Negative for chest pain.   Gastrointestinal: Positive for abdominal pain and nausea.   Endocrine: Negative.    Genitourinary: Negative.  Negative for dysuria.   Skin: Negative.    Neurological: Negative.    Psychiatric/Behavioral: Negative.    All other systems reviewed and are negative.      Past Medical History:   Diagnosis Date   • Hypertension        No Known Allergies    Past Surgical History:   Procedure Laterality Date   • KNEE SURGERY         Family History   Problem Relation Age of Onset   • Heart attack Father    • Heart disease Father        Social History     Socioeconomic History   • Marital status:      Spouse name: Not on file   • Number of children: Not on file   • Years of education: Not on file   • Highest education level: Not on file   Tobacco Use   • Smoking status: Never Smoker   • Smokeless tobacco: Current User     Types: Chew   Substance and Sexual Activity   • Alcohol use: No   • Drug use: No   • Sexual activity: Yes     Partners: Female     Birth control/protection: None           Objective   Physical Exam  Vitals and nursing note reviewed.   Constitutional:       General: He is not in acute distress.     Appearance: He is well-developed. He is not diaphoretic.   HENT:      Head: Normocephalic and atraumatic.      Right Ear: External ear normal.      Left Ear: External ear normal.      Nose: Nose normal.   Eyes:      Conjunctiva/sclera:  Conjunctivae normal.   Neck:      Vascular: No JVD.      Trachea: No tracheal deviation.   Cardiovascular:      Rate and Rhythm: Normal rate.      Heart sounds: No murmur heard.     Pulmonary:      Effort: Pulmonary effort is normal. No respiratory distress.      Breath sounds: No wheezing.   Abdominal:      General: Bowel sounds are normal.      Palpations: Abdomen is soft.      Tenderness: There is abdominal tenderness in the right upper quadrant and epigastric area.   Musculoskeletal:         General: No deformity. Normal range of motion.      Cervical back: Normal range of motion and neck supple.   Skin:     General: Skin is warm and dry.      Coloration: Skin is not pale.      Findings: No erythema or rash.   Neurological:      Mental Status: He is alert and oriented to person, place, and time.      Cranial Nerves: No cranial nerve deficit.   Psychiatric:         Behavior: Behavior normal.         Thought Content: Thought content normal.         Procedures           ED Course  ED Course as of Jul 22 0759   Thu Jul 22, 2021   0757 CT abd pelvis rad interpreted:  Diverticulosis. No acute or inflammatory changes in the abdomen or pelvis.    [RB]      ED Course User Index  [RB] Stevo Neff II, PA                                           MDM  Number of Diagnoses or Management Options  Epigastric abdominal pain: established and worsening     Amount and/or Complexity of Data Reviewed  Clinical lab tests: ordered and reviewed  Tests in the radiology section of CPT®: reviewed and ordered    Risk of Complications, Morbidity, and/or Mortality  Presenting problems: moderate  Diagnostic procedures: moderate  Management options: low    Patient Progress  Patient progress: stable      Final diagnoses:   Epigastric abdominal pain       ED Disposition  ED Disposition     ED Disposition Condition Comment    Discharge Stable           Jad Porras MD  [address] [place] [address]  [phone]    Schedule an appointment as  soon as possible for a visit            Medication List      New Prescriptions    ondansetron ODT 4 MG disintegrating tablet  Commonly known as: ZOFRAN-ODT  Place 1 tablet on the tongue Every 6 (Six) Hours As Needed for Nausea (abd pain).           Where to Get Your Medications      You can get these medications from any pharmacy    Bring a paper prescription for each of these medications  · ondansetron ODT 4 MG disintegrating tablet          Stevo Neff II, PA  07/22/21 4898

## 2021-07-22 NOTE — ED NOTES
Patient waiting in ED lobby at this time. Updated on wait times and apologized, pt verbalizes understanding. No changes or new complaints at this time, WCTM.     Obdulia Angel, RN  07/22/21 6688

## 2021-07-26 ENCOUNTER — OFFICE VISIT (OUTPATIENT)
Dept: SURGERY | Facility: CLINIC | Age: 46
End: 2021-07-26

## 2021-07-26 VITALS
SYSTOLIC BLOOD PRESSURE: 128 MMHG | BODY MASS INDEX: 31.21 KG/M2 | DIASTOLIC BLOOD PRESSURE: 80 MMHG | WEIGHT: 218 LBS | HEIGHT: 70 IN

## 2021-07-26 DIAGNOSIS — R10.13 EPIGASTRIC PAIN: Primary | ICD-10-CM

## 2021-07-26 PROCEDURE — 99203 OFFICE O/P NEW LOW 30 MIN: CPT | Performed by: SURGERY

## 2021-07-26 NOTE — PROGRESS NOTES
Subjective   Sahil Barnes is a 45 y.o. male.     Chief Complaint: upper abdominal pain    History of Present Illness He was seen in the ER last week for epigastric pain. US of gallbladder was negative and CT only showed some diverticulosis. Labs were unremarkable. He had a colonoscopy 2 years ago. No bleeding.    The following portions of the patient's history were reviewed and updated as appropriate: current medications, past family history, past medical history, past social history, past surgical history and problem list.    Review of Systems   Constitutional: Negative for activity change, appetite change, chills, fever and unexpected weight change.   HENT: Negative for congestion, facial swelling and sore throat.    Eyes: Negative for photophobia and visual disturbance.   Respiratory: Negative for chest tightness, shortness of breath and wheezing.    Cardiovascular: Negative for chest pain, palpitations and leg swelling.   Gastrointestinal: Positive for abdominal pain and nausea. Negative for abdominal distention, anal bleeding, blood in stool, constipation, diarrhea, rectal pain and vomiting.   Endocrine: Negative for cold intolerance, heat intolerance, polydipsia and polyuria.   Genitourinary: Negative for difficulty urinating, dysuria, flank pain and urgency.   Musculoskeletal: Negative for back pain and myalgias.   Skin: Negative for rash and wound.   Allergic/Immunologic: Negative for immunocompromised state.   Neurological: Negative for dizziness, seizures, syncope, light-headedness, numbness and headaches.   Hematological: Negative for adenopathy. Does not bruise/bleed easily.   Psychiatric/Behavioral: Negative for behavioral problems and confusion. The patient is not nervous/anxious.        Objective   Physical Exam  Vitals reviewed.   Constitutional:       General: He is not in acute distress.     Appearance: He is well-developed. He is not ill-appearing.   HENT:      Head: Normocephalic. No  laceration. Hair is normal.      Right Ear: Hearing and ear canal normal.      Left Ear: Hearing and ear canal normal.      Nose: Nose normal.      Right Sinus: No maxillary sinus tenderness or frontal sinus tenderness.      Left Sinus: No maxillary sinus tenderness or frontal sinus tenderness.   Eyes:      General: Lids are normal.      Conjunctiva/sclera: Conjunctivae normal.      Pupils: Pupils are equal, round, and reactive to light.   Neck:      Thyroid: No thyroid mass or thyromegaly.      Vascular: No JVD.      Trachea: No tracheal tenderness or tracheal deviation.   Cardiovascular:      Rate and Rhythm: Normal rate and regular rhythm.      Heart sounds: No murmur heard.   No gallop.    Pulmonary:      Effort: Pulmonary effort is normal.      Breath sounds: Normal breath sounds. No stridor. No wheezing.   Chest:      Chest wall: No tenderness.   Abdominal:      General: Bowel sounds are normal. There is no distension.      Palpations: Abdomen is soft. There is no mass.      Tenderness: There is no abdominal tenderness. There is no guarding or rebound.      Hernia: No hernia is present.   Musculoskeletal:         General: No deformity.      Cervical back: Normal range of motion.   Lymphadenopathy:      Cervical: No cervical adenopathy.      Upper Body:      Right upper body: No supraclavicular adenopathy.      Left upper body: No supraclavicular adenopathy.   Skin:     General: Skin is warm and dry.      Coloration: Skin is not pale.      Findings: No erythema or rash.   Neurological:      Mental Status: He is alert and oriented to person, place, and time.      Motor: No abnormal muscle tone.   Psychiatric:         Behavior: Behavior normal.         Thought Content: Thought content normal.         Past Medical History:   Diagnosis Date   • Hypertension        Family History   Problem Relation Age of Onset   • Heart attack Father    • Heart disease Father        Social History     Tobacco Use   • Smoking  status: Never Smoker   • Smokeless tobacco: Current User     Types: Chew   Substance Use Topics   • Alcohol use: No   • Drug use: No       Past Surgical History:   Procedure Laterality Date   • KNEE SURGERY         Current Outpatient Medications   Medication Instructions   • metoprolol tartrate (LOPRESSOR) 25 mg, Oral, 2 Times Daily   • nitroglycerin (NITROSTAT) 0.4 MG SL tablet 1 under the tongue as needed for angina, may repeat q5mins for up three doses   • ondansetron ODT (ZOFRAN-ODT) 4 mg, Translingual, Every 6 Hours PRN         Assessment/Plan   Diagnoses and all orders for this visit:    1. Epigastric pain (Primary)      He will need a HIDA and if abnormal a lap jayjay

## 2021-08-03 ENCOUNTER — HOSPITAL ENCOUNTER (OUTPATIENT)
Dept: NUCLEAR MEDICINE | Facility: HOSPITAL | Age: 46
Discharge: HOME | End: 2021-08-03

## 2021-08-03 DIAGNOSIS — R10.13 EPIGASTRIC PAIN: ICD-10-CM

## 2021-08-03 PROCEDURE — A9537 TC99M MEBROFENIN: HCPCS | Performed by: SURGERY

## 2021-08-03 PROCEDURE — 78226 HEPATOBILIARY SYSTEM IMAGING: CPT | Performed by: RADIOLOGY

## 2021-08-03 PROCEDURE — 78226 HEPATOBILIARY SYSTEM IMAGING: CPT

## 2021-08-03 PROCEDURE — 0 TECHNETIUM TC 99M MEBROFENIN KIT: Performed by: SURGERY

## 2021-08-03 RX ORDER — KIT FOR THE PREPARATION OF TECHNETIUM TC 99M MEBROFENIN 45 MG/10ML
1 INJECTION, POWDER, LYOPHILIZED, FOR SOLUTION INTRAVENOUS
Status: COMPLETED | OUTPATIENT
Start: 2021-08-03 | End: 2021-08-03

## 2021-08-03 RX ADMIN — MEBROFENIN 1 DOSE: 45 INJECTION, POWDER, LYOPHILIZED, FOR SOLUTION INTRAVENOUS at 09:35

## 2021-08-30 ENCOUNTER — OFFICE VISIT (OUTPATIENT)
Dept: SURGERY | Facility: CLINIC | Age: 46
End: 2021-08-30

## 2021-08-30 VITALS — WEIGHT: 218 LBS | HEIGHT: 70 IN | BODY MASS INDEX: 31.21 KG/M2

## 2021-08-30 DIAGNOSIS — R11.0 NAUSEA: ICD-10-CM

## 2021-08-30 DIAGNOSIS — R10.13 EPIGASTRIC PAIN: Primary | ICD-10-CM

## 2021-08-30 PROCEDURE — 99213 OFFICE O/P EST LOW 20 MIN: CPT | Performed by: SURGERY

## 2021-08-30 RX ORDER — METRONIDAZOLE 500 MG/1
TABLET ORAL
COMMUNITY
Start: 2021-08-11 | End: 2022-10-06

## 2021-08-30 NOTE — PROGRESS NOTES
Subjective   Sahil Barnes is a 46 y.o. male.     Chief Complaint: epigastric pain and nausea    History of Present Illness He has had a normal CT other than diverticulosis and US was ok. He had HIDA EF 48%. He had minimal symptoms with the HIDA. The symptoms seem to be from the volume of food eaten, not what type of food. He has occasional diverticulitis flare ups but that is not  Like this. He has had prior colonoscpies. No prior EGD    The following portions of the patient's history were reviewed and updated as appropriate: current medications, past family history, past medical history, past social history, past surgical history and problem list.    Review of Systems   Constitutional: Positive for unexpected weight change. Negative for activity change, appetite change, chills and fever.   HENT: Negative for congestion, facial swelling and sore throat.    Eyes: Negative for photophobia and visual disturbance.   Respiratory: Negative for chest tightness, shortness of breath and wheezing.    Cardiovascular: Negative for chest pain, palpitations and leg swelling.   Gastrointestinal: Positive for abdominal pain, diarrhea and nausea. Negative for abdominal distention, anal bleeding, blood in stool, constipation, rectal pain and vomiting.   Endocrine: Negative for cold intolerance, heat intolerance, polydipsia and polyuria.   Genitourinary: Negative for difficulty urinating, dysuria, flank pain and urgency.   Musculoskeletal: Negative for back pain and myalgias.   Skin: Negative for rash and wound.   Allergic/Immunologic: Negative for immunocompromised state.   Neurological: Negative for dizziness, seizures, syncope, light-headedness, numbness and headaches.   Hematological: Negative for adenopathy. Does not bruise/bleed easily.   Psychiatric/Behavioral: Negative for behavioral problems and confusion. The patient is not nervous/anxious.        Objective   Physical Exam  Vitals reviewed.   Constitutional:       General:  He is not in acute distress.     Appearance: He is well-developed. He is not ill-appearing.   HENT:      Head: Normocephalic. No laceration. Hair is normal.      Right Ear: Hearing and ear canal normal.      Left Ear: Hearing and ear canal normal.      Nose: Nose normal.      Right Sinus: No maxillary sinus tenderness or frontal sinus tenderness.      Left Sinus: No maxillary sinus tenderness or frontal sinus tenderness.   Eyes:      General: Lids are normal.      Conjunctiva/sclera: Conjunctivae normal.      Pupils: Pupils are equal, round, and reactive to light.   Neck:      Thyroid: No thyroid mass or thyromegaly.      Vascular: No JVD.      Trachea: No tracheal tenderness or tracheal deviation.   Cardiovascular:      Rate and Rhythm: Normal rate and regular rhythm.      Heart sounds: No murmur heard.   No gallop.    Pulmonary:      Effort: Pulmonary effort is normal.      Breath sounds: Normal breath sounds. No stridor. No wheezing.   Chest:      Chest wall: No tenderness.   Abdominal:      General: Bowel sounds are normal. There is no distension.      Palpations: Abdomen is soft. There is no mass.      Tenderness: There is no abdominal tenderness. There is no guarding or rebound.      Hernia: No hernia is present.   Musculoskeletal:         General: No deformity.      Cervical back: Normal range of motion.   Lymphadenopathy:      Cervical: No cervical adenopathy.      Upper Body:      Right upper body: No supraclavicular adenopathy.      Left upper body: No supraclavicular adenopathy.   Skin:     General: Skin is warm and dry.      Coloration: Skin is not pale.      Findings: No erythema or rash.   Neurological:      Mental Status: He is alert and oriented to person, place, and time.      Motor: No abnormal muscle tone.   Psychiatric:         Behavior: Behavior normal.         Thought Content: Thought content normal.         Past Medical History:   Diagnosis Date   • Hypertension        Family History   Problem  Relation Age of Onset   • Heart attack Father    • Heart disease Father        Social History     Tobacco Use   • Smoking status: Never Smoker   • Smokeless tobacco: Current User     Types: Chew   Substance Use Topics   • Alcohol use: No   • Drug use: No       Past Surgical History:   Procedure Laterality Date   • ANAL FISSURECTOMY     • COLONOSCOPY     • KNEE SURGERY         Current Outpatient Medications   Medication Instructions   • metoprolol tartrate (LOPRESSOR) 25 mg, Oral, 2 Times Daily   • metroNIDAZOLE (FLAGYL) 500 MG tablet TAKE 1 TABLET BY MOUTH THREE TIMES A DAY FOR 14 DAYS   • nitroglycerin (NITROSTAT) 0.4 MG SL tablet 1 under the tongue as needed for angina, may repeat q5mins for up three doses   • ondansetron ODT (ZOFRAN-ODT) 4 mg, Translingual, Every 6 Hours PRN         Assessment/Plan   Diagnoses and all orders for this visit:    1. Epigastric pain (Primary)    2. Nausea    EGD when elective cases can be scheduled.

## 2021-09-14 ENCOUNTER — OFFICE VISIT (OUTPATIENT)
Dept: CARDIOLOGY | Facility: CLINIC | Age: 46
End: 2021-09-14

## 2021-09-14 VITALS
HEIGHT: 70 IN | RESPIRATION RATE: 16 BRPM | BODY MASS INDEX: 30.95 KG/M2 | TEMPERATURE: 97.1 F | SYSTOLIC BLOOD PRESSURE: 154 MMHG | DIASTOLIC BLOOD PRESSURE: 89 MMHG | WEIGHT: 216.2 LBS | HEART RATE: 53 BPM

## 2021-09-14 DIAGNOSIS — I10 ESSENTIAL HYPERTENSION: Primary | ICD-10-CM

## 2021-09-14 PROCEDURE — 99213 OFFICE O/P EST LOW 20 MIN: CPT | Performed by: PHYSICIAN ASSISTANT

## 2021-09-14 PROCEDURE — 93000 ELECTROCARDIOGRAM COMPLETE: CPT | Performed by: PHYSICIAN ASSISTANT

## 2021-09-14 NOTE — PROGRESS NOTES
Aamir Wilder DO  Sahil Barnes  1975 09/14/2021    Patient Active Problem List   Diagnosis   • Precordial pain   • Essential hypertension   • Epigastric pain   • Nausea       Dear Aamir Wilder DO:    Subjective     History of Present Illness:    Chief Complaint   Patient presents with   • Follow-up     1 yr precordial pain   • Med Management     verbal   • Dizziness     positional       Sahil Barnes is a pleasant 46 y.o. male with a past medical history significant for no known CAD. He was orgiinally referred to our office for chest pains where he did undergo a stress test that was unremarkable. He does have history of essential hypertension. He comes in today for annual follow up.     Matheus reports that he has been doing well. He does deny any chest pains today, shortness of breath, or syncope. Blood pressure has been controlled at home. He did have cmp and cbc done in July that was unremarkable. He is bradycardic today and does state that he does occasionally get dizzy, particularly if he bends over.     No Known Allergies:      Current Outpatient Medications:   •  metoprolol tartrate (LOPRESSOR) 25 MG tablet, Take 1 tablet by mouth 2 (Two) Times a Day., Disp: 180 tablet, Rfl: 2  •  ondansetron ODT (ZOFRAN-ODT) 4 MG disintegrating tablet, Place 1 tablet on the tongue Every 6 (Six) Hours As Needed for Nausea (abd pain)., Disp: 20 tablet, Rfl: 0  •  metroNIDAZOLE (FLAGYL) 500 MG tablet, TAKE 1 TABLET BY MOUTH THREE TIMES A DAY FOR 14 DAYS, Disp: , Rfl:   •  nitroglycerin (NITROSTAT) 0.4 MG SL tablet, 1 under the tongue as needed for angina, may repeat q5mins for up three doses, Disp: 25 tablet, Rfl: 3    The following portions of the patient's history were reviewed and updated as appropriate: allergies, current medications, past family history, past medical history, past social history, past surgical history and problem list.    Social History     Tobacco Use   • Smoking status: Never Smoker   •  "Smokeless tobacco: Current User     Types: Chew   Substance Use Topics   • Alcohol use: No   • Drug use: No         Objective   Vitals:    09/14/21 0957   BP: 154/89   Pulse: 53   Resp: 16   Temp: 97.1 °F (36.2 °C)   Weight: 98.1 kg (216 lb 3.2 oz)   Height: 177.8 cm (70\")     Body mass index is 31.02 kg/m².    Constitutional:       General: Not in acute distress.     Appearance: Healthy appearance. Well-developed and not in distress. Not diaphoretic.   Eyes:      Conjunctiva/sclera: Conjunctivae normal.      Pupils: Pupils are equal, round, and reactive to light.   HENT:      Head: Normocephalic and atraumatic.   Neck:      Vascular: No carotid bruit or JVD.   Pulmonary:      Effort: Pulmonary effort is normal. No respiratory distress.      Breath sounds: Normal breath sounds.   Cardiovascular:      Normal rate. Regular rhythm.   Skin:     General: Skin is cool.   Neurological:      Mental Status: Alert, oriented to person, place, and time and oriented to person, place and time.         Lab Results   Component Value Date     07/22/2021    K 3.9 07/22/2021     07/22/2021    CO2 27.6 07/22/2021    BUN 9 07/22/2021    CREATININE 0.84 07/22/2021    GLUCOSE 98 07/22/2021    CALCIUM 9.0 07/22/2021    AST 23 07/22/2021    ALT 42 (H) 07/22/2021    ALKPHOS 93 07/22/2021     No results found for: CKTOTAL  Lab Results   Component Value Date    WBC 6.45 07/22/2021    HGB 15.8 07/22/2021    HCT 46.2 07/22/2021     07/22/2021     No results found for: INR  No results found for: MG  No results found for: TSH, PSA, CHLPL, TRIG, HDL, LDL   No results found for: BNP    During this visit the following were done:  Labs Reviewed []    Labs Ordered []    Radiology Reports Reviewed []    Radiology Ordered []    PCP Records Reviewed []    Referring Provider Records Reviewed []    ER Records Reviewed []    Hospital Records Reviewed []    History Obtained From Family []    Radiology Images Reviewed []    Other Reviewed [] "    Records Requested []         ECG 12 Lead    Date/Time: 9/14/2021 9:58 AM  Performed by: Sundeep Caruso PA-C  Authorized by: Sundeep Caruso PA-C   Comparison: compared with previous ECG   Similar to previous ECG  Rhythm: sinus bradycardia  Rhythm comments: rate of 49  ST Segments: ST segments normal    Clinical impression: normal ECG            Assessment/Plan    Diagnosis Plan   1. Essential hypertension              Recommendations:  1. History of precordial pain  1. Patient still asytompatic, no further work up indicated at this time.   2. Essential hypertension  1. Currently conttrolled at home.   2. He is bradycardic. He has started walking 4-5 miles nightly and has lost nearly 20 pounds since I saw him last year. I did advise him to hold lopressor and monitor for improvement in his heart rate. If BP becomes elevated we can consider a antihypertensive that does not affect heart rate.       No follow-ups on file.    As always, I appreciate very much the opportunity to participate in the cardiovascular care of your patients.      With Best Regards,    Sundeep Caruso PA-C

## 2022-10-06 ENCOUNTER — OFFICE VISIT (OUTPATIENT)
Dept: CARDIOLOGY | Facility: CLINIC | Age: 47
End: 2022-10-06

## 2022-10-06 ENCOUNTER — TELEPHONE (OUTPATIENT)
Dept: CARDIOLOGY | Facility: CLINIC | Age: 47
End: 2022-10-06

## 2022-10-06 VITALS
OXYGEN SATURATION: 99 % | DIASTOLIC BLOOD PRESSURE: 99 MMHG | SYSTOLIC BLOOD PRESSURE: 154 MMHG | HEIGHT: 70 IN | WEIGHT: 215 LBS | HEART RATE: 62 BPM | BODY MASS INDEX: 30.78 KG/M2

## 2022-10-06 DIAGNOSIS — I10 ESSENTIAL HYPERTENSION: Primary | ICD-10-CM

## 2022-10-06 PROCEDURE — 99213 OFFICE O/P EST LOW 20 MIN: CPT | Performed by: PHYSICIAN ASSISTANT

## 2022-10-06 PROCEDURE — 93000 ELECTROCARDIOGRAM COMPLETE: CPT | Performed by: PHYSICIAN ASSISTANT

## 2022-10-06 RX ORDER — AMLODIPINE BESYLATE 5 MG/1
5 TABLET ORAL DAILY
Qty: 30 TABLET | Refills: 3 | Status: SHIPPED | OUTPATIENT
Start: 2022-10-06 | End: 2023-01-06 | Stop reason: SINTOL

## 2022-10-06 NOTE — TELEPHONE ENCOUNTER
----- Message from Sundeep Caruso PA-C sent at 10/6/2022  8:51 AM EDT -----  Can we get recent labs from pcp      REQUESTED

## 2022-10-06 NOTE — PROGRESS NOTES
"Aamir Wilder DO  Sahil Barnes  1975  10/06/2022    Patient Active Problem List   Diagnosis   • Precordial pain   • Essential hypertension   • Epigastric pain   • Nausea       Dear Aamir Wilder DO:    Subjective     History of Present Illness:    Chief Complaint   Patient presents with   • Follow-up     1 year    • Med Management     verbal       Sahil Barnes is a pleasant 47 y.o. male with a past medical history significant for essential hypertension. He denies history of CAD, DM, or tobacco abuse. He comes in for routine cardiology follow up.     Matheus has no cardiac complaints today such as chest pains, shortness of breath, or palpitations.  However his blood pressure has been mildly elevated at home although he does have some form of whitecoat hypertension as his blood pressure is markedly elevated in the office he reports on average his blood pressure is around 140/80 at home.    No Known Allergies:      Current Outpatient Medications:   •  nitroglycerin (NITROSTAT) 0.4 MG SL tablet, 1 under the tongue as needed for angina, may repeat q5mins for up three doses, Disp: 25 tablet, Rfl: 3  •  amLODIPine (NORVASC) 5 MG tablet, Take 1 tablet by mouth Daily., Disp: 30 tablet, Rfl: 3    The following portions of the patient's history were reviewed and updated as appropriate: allergies, current medications, past family history, past medical history, past social history, past surgical history and problem list.    Social History     Tobacco Use   • Smoking status: Never Smoker   • Smokeless tobacco: Current User     Types: Chew   Substance Use Topics   • Alcohol use: No   • Drug use: No         Objective   Vitals:    10/06/22 0818 10/06/22 0819   BP: 159/90 154/99   BP Location: Right arm Left arm   Patient Position: Sitting Sitting   Cuff Size: Adult Adult   Pulse: 62    SpO2: 99%    Weight: 97.5 kg (215 lb)    Height: 177.8 cm (70\")      Body mass index is 30.85 kg/m².    Constitutional:       General: Not " in acute distress.     Appearance: Healthy appearance. Well-developed and not in distress. Not diaphoretic.   Eyes:      Conjunctiva/sclera: Conjunctivae normal.      Pupils: Pupils are equal, round, and reactive to light.   HENT:      Head: Normocephalic and atraumatic.   Neck:      Vascular: No carotid bruit or JVD.   Pulmonary:      Effort: Pulmonary effort is normal. No respiratory distress.      Breath sounds: Normal breath sounds.   Cardiovascular:      Normal rate. Regular rhythm.   Skin:     General: Skin is cool.   Neurological:      Mental Status: Alert, oriented to person, place, and time and oriented to person, place and time.         Lab Results   Component Value Date     07/22/2021    K 3.9 07/22/2021     07/22/2021    CO2 27.6 07/22/2021    BUN 9 07/22/2021    CREATININE 0.84 07/22/2021    GLUCOSE 98 07/22/2021    CALCIUM 9.0 07/22/2021    AST 23 07/22/2021    ALT 42 (H) 07/22/2021    ALKPHOS 93 07/22/2021     No results found for: CKTOTAL  Lab Results   Component Value Date    WBC 6.45 07/22/2021    HGB 15.8 07/22/2021    HCT 46.2 07/22/2021     07/22/2021     No results found for: INR  No results found for: MG  No results found for: TSH, PSA, CHLPL, TRIG, HDL, LDL   No results found for: BNP    During this visit the following were done:  Labs Reviewed []    Labs Ordered []    Radiology Reports Reviewed []    Radiology Ordered []    PCP Records Reviewed []    Referring Provider Records Reviewed []    ER Records Reviewed []    Hospital Records Reviewed []    History Obtained From Family []    Radiology Images Reviewed []    Other Reviewed []    Records Requested []         ECG 12 Lead    Date/Time: 10/6/2022 8:09 AM  Performed by: Sundeep Caruso PA-C  Authorized by: Sundeep Caruso PA-C   Comparison: compared with previous ECG   Similar to previous ECG  Rhythm: sinus rhythm  Conduction: conduction normal  T Waves: T waves normal    Clinical impression: normal  ECG          Assessment & Plan    Diagnosis Plan   1. Essential hypertension              Recommendations:  1. Essential hypertension  1. We will start amlodipine 5 mg.      Return in about 3 months (around 1/6/2023).    As always, I appreciate very much the opportunity to participate in the cardiovascular care of your patients.      With Best Regards,    Sundeep Caruso PA-C

## 2023-01-04 RX ORDER — AMLODIPINE BESYLATE 5 MG/1
5 TABLET ORAL DAILY
Qty: 30 TABLET | Refills: 3 | Status: CANCELLED | OUTPATIENT
Start: 2023-01-04

## 2023-01-04 NOTE — TELEPHONE ENCOUNTER
Caller: Sahil Barnes    Relationship: Self    Best call back number: [phone]     What is the best time to reach you: ANY    Who are you requesting to speak with (clinical staff, provider,  specific staff member): CLINICAL    Do you know the name of the person who called: PT    What was the call regarding: PT WAS PRESCRIBED AMLODIPINE AND ADVISED BY TETO IF HE HAD ANY REACTION OR SIDE EFFECTS TO CONTINUE TAKING METOPROLOL 25MG 2X DAILY. PT IS OUT OF METOPROLOL 25MG 2X DAILY AMD IS NEEDING PRESCRIPTION SENT TO KNOW PHARMACY IN [place]    Do you require a callback: PLEASE CALL PT WHEN COMPLETED     LESS THAN 3 DAY SUPPLY

## 2023-01-04 NOTE — TELEPHONE ENCOUNTER
Looks like you sent Amlodipine and pt needs metoprolol tartrate 25mg that he took before he quit the amlodipine

## 2023-01-04 NOTE — TELEPHONE ENCOUNTER
I called and spoke to pt and he stopped the amlodipine due to his legs hurting and he states Sundeep told him he could stop this and start taking the metoprolol 25mg again if he needed to so he did because he likes the Metoprolol better for his BP. He states the only reason he changed was his heart rate was dropping at times but he was exercising then and working a lot more and isnt now so he wants a RX sent into pharmacy because he only has 2 pills left.

## 2023-01-06 ENCOUNTER — OFFICE VISIT (OUTPATIENT)
Dept: CARDIOLOGY | Facility: CLINIC | Age: 48
End: 2023-01-06
Payer: COMMERCIAL

## 2023-01-06 VITALS
RESPIRATION RATE: 16 BRPM | SYSTOLIC BLOOD PRESSURE: 153 MMHG | HEIGHT: 70 IN | BODY MASS INDEX: 31.58 KG/M2 | DIASTOLIC BLOOD PRESSURE: 91 MMHG | OXYGEN SATURATION: 98 % | WEIGHT: 220.6 LBS | HEART RATE: 68 BPM

## 2023-01-06 DIAGNOSIS — I10 ESSENTIAL HYPERTENSION: Primary | ICD-10-CM

## 2023-01-06 PROCEDURE — 99213 OFFICE O/P EST LOW 20 MIN: CPT | Performed by: NURSE PRACTITIONER

## 2023-01-06 NOTE — PROGRESS NOTES
[facility]             Lucille MARITA Vasquez Paul A, DO  Sahil Barnes  1975 01/06/2023    Patient Active Problem List   Diagnosis   • Precordial pain   • Essential hypertension   • Epigastric pain   • Nausea       Dear Aamir Wilder DO:    Subjective     Chief Complaint   Patient presents with   • Hypertension     3 mos   • Med Management     verbal       HPI:     This is a 47 y.o. male with known past medical history of essential hypertension.    Sahil Barnes presents today for routine cardiology follow up.  Patient states he has been doing overall well since his last visit.  Denies any chest pain, shortness of breath or palpitations.  He states he stopped taking amlodipine for his blood pressure due to lower extremity edema.  He states blood pressure has been running usually 130s systolic.  He has been taking metoprolol as prescribed however he did run out and has not had his metoprolol for around 1 week.  Blood pressure mildly elevated in the office today. Lab work in October 2022 was essentially unremarkable.  LDL elevated at 122.    • Diagnostic Testing  1. Stress echo 1/2019: Normal LV function with no evidence of ischemia     All other systems were reviewed and were negative.    Patient Active Problem List   Diagnosis   • Precordial pain   • Essential hypertension   • Epigastric pain   • Nausea       family history includes Heart attack in his father; Heart disease in his father.     reports that he has never smoked. His smokeless tobacco use includes chew. He reports that he does not drink alcohol and does not use drugs.    No Known Allergies      Current Outpatient Medications:   •  metoprolol tartrate (LOPRESSOR) 25 MG tablet, Take 1 tablet by mouth 2 (Two) Times a Day., Disp: 60 tablet, Rfl: 6  •  nitroglycerin (NITROSTAT) 0.4 MG SL tablet, 1 under the tongue as needed for angina, may repeat q5mins for up three doses, Disp: 25 tablet, Rfl:  3      Physical Exam:  I have reviewed the patient's current vital signs as documented in the patient's EMR.   Vitals:    01/06/23 0958   BP: 153/91   Pulse: 68   Resp: 16   SpO2: 98%     Body mass index is 31.65 kg/m².       01/06/23  0958   Weight: 100 kg (220 lb 9.6 oz)      Physical Exam  Constitutional:       General: He is not in acute distress.     Appearance: Normal appearance. He is well-developed.   HENT:      Head: Normocephalic and atraumatic.      Mouth/Throat:      Mouth: Mucous membranes are moist.   Eyes:      Extraocular Movements: Extraocular movements intact.      Pupils: Pupils are equal, round, and reactive to light.   Neck:      Vascular: No JVD.   Cardiovascular:      Rate and Rhythm: Normal rate and regular rhythm.      Heart sounds: Normal heart sounds. No murmur heard.    No S3 or S4 sounds.   Pulmonary:      Effort: Pulmonary effort is normal. No respiratory distress.      Breath sounds: Normal breath sounds. No wheezing.   Abdominal:      General: Bowel sounds are normal. There is no distension.      Palpations: Abdomen is soft. There is no hepatomegaly.      Tenderness: There is no abdominal tenderness.   Musculoskeletal:         General: Normal range of motion.      Cervical back: Normal range of motion and neck supple.   Skin:     General: Skin is warm and dry.      Coloration: Skin is not jaundiced or pale.   Neurological:      General: No focal deficit present.      Mental Status: He is alert and oriented to person, place, and time. Mental status is at baseline.   Psychiatric:         Mood and Affect: Mood normal.         Behavior: Behavior normal.         Thought Content: Thought content normal.         Judgment: Judgment normal.            DATA REVIEWED:     TTE/YAMINI:  Results for orders placed during the hospital encounter of 01/29/19    Adult Stress Echo W/ Cont or Stress Agent if Necessary Per Protocol    Interpretation Summary  · Echocardiogram Findings:  · Normal left  ventricular cavity size and wall thickness noted. All left ventricular wall segments contract normally.  · Estimated EF appears to be in the range of 61 - 65%  · The aortic valve is structurally normal. No aortic valve regurgitation is present. No aortic valve stenosis is present.  · The mitral valve is normal in structure. Mild mitral valve regurgitation is present. No significant mitral valve stenosis is present.  · The tricuspid valve is normal. No tricuspid valve stenosis is present. Mild tricuspid valve regurgitation is present. Estimated right ventricular systolic pressure from tricuspid regurgitation is normal (<35 mmHg).  · There is no evidence of pericardial effusion.  · Stress Procedure:  · A stress test was performed following the Allan protocol.  · Exercise duration (min) 10 min Exercise duration (sec) 0 sec Estimated workload 12.8 METS  · Baseline Vitals Baseline HR 76 bpm Baseline /96 mmHg Peak Stress Vitals Peak  bpm Peak /68 mmHg Recovery Vitals Recovery HR 89 bpm Recovery /64 mmHg Exercise Data Target HR (85%) 149 bpm Max. Pred. HR (100%) 175 bpm Percent Max Pred HR 90.86 %  · There was no ST segment deviation noted during stress.  · Arrhythmias during stress: occasional PVCs.  · Normal ECG stress ECG interpretation.  · Stress Echo Findings:  · Segment augmentation had a normal response to stress.  · Cavity size behaved normally in response to stress. Left ventricular function is normal. Septal wall motion is normal.  · Post stress images with adequate visualization of myocardium to assess for ischemia  · Normal stress echo with no significant echocardiographic evidence for myocardial ischemia.      Laboratory evaluations:    Lab Results   Component Value Date    GLUCOSE 98 07/22/2021    BUN 9 07/22/2021    CREATININE 0.84 07/22/2021    EGFRIFNONA 98 07/22/2021    BCR 10.7 07/22/2021    K 3.9 07/22/2021    CO2 27.6 07/22/2021    CALCIUM 9.0 07/22/2021    ALBUMIN 4.32  07/22/2021    AST 23 07/22/2021    ALT 42 (H) 07/22/2021     Lab Results   Component Value Date    WBC 6.45 07/22/2021    HGB 15.8 07/22/2021    HCT 46.2 07/22/2021    MCV 92.8 07/22/2021     07/22/2021     No results found for: CHOL, CHLPL, TRIG, HDL, LDL, LDLDIRECT  No results found for: TSH, Y5DHPHN, I1FWNMQ, THYROIDAB  No results found for: HGBA1C  Lab Results   Component Value Date    ALT 42 (H) 07/22/2021     No results found for: HGBA1C  Lab Results   Component Value Date    CREATININE 0.84 07/22/2021     No results found for: IRON, TIBC, FERRITIN  No results found for: INR, PROTIME        --------------------------------------------------------------------------------------------------------------------------    ASSESSMENT/PLAN:      Diagnosis Plan   1. Essential hypertension  metoprolol tartrate (LOPRESSOR) 25 MG tablet          1. Essential hypertension  • Blood pressure mildly elevated in the office today however patient has been out of his metoprolol for 1 week.  Reports blood pressure runs normally in the 130s systolic at home.  He did stop amlodipine secondary to lower extremity edema.  Continue with metoprolol at current dosing for now.  I did instruct him that if blood pressure remains over 140 systolic to call the office and we can consider adding lisinopril for his blood pressure.  • Advise low-sodium diet.  • Lab work from October 2022 reviewed and essentially unremarkable.      This document has been @Electronically signed by MARITA Sanchez, 01/06/23, 9:16 AM EST.       Dictated Utilizing Dragon Dictation: Part of this note may be an electronic transcription/translation of spoken language to printed text using the Dragon Dictation System.    Follow-up appointment and medication changes provided in hand delivered After Visit Summary as well as reviewed in the room.

## 2023-11-27 DIAGNOSIS — I10 ESSENTIAL HYPERTENSION: ICD-10-CM

## 2024-01-30 ENCOUNTER — APPOINTMENT (OUTPATIENT)
Dept: GENERAL RADIOLOGY | Facility: HOSPITAL | Age: 49
End: 2024-01-30
Payer: COMMERCIAL

## 2024-01-30 ENCOUNTER — HOSPITAL ENCOUNTER (EMERGENCY)
Facility: HOSPITAL | Age: 49
Discharge: HOME | End: 2024-01-30
Attending: STUDENT IN AN ORGANIZED HEALTH CARE EDUCATION/TRAINING PROGRAM | Admitting: STUDENT IN AN ORGANIZED HEALTH CARE EDUCATION/TRAINING PROGRAM
Payer: COMMERCIAL

## 2024-01-30 VITALS
DIASTOLIC BLOOD PRESSURE: 76 MMHG | TEMPERATURE: 97.6 F | WEIGHT: 220 LBS | HEIGHT: 70 IN | BODY MASS INDEX: 31.5 KG/M2 | OXYGEN SATURATION: 97 % | RESPIRATION RATE: 17 BRPM | SYSTOLIC BLOOD PRESSURE: 125 MMHG | HEART RATE: 58 BPM

## 2024-01-30 DIAGNOSIS — R07.9 CHEST PAIN, UNSPECIFIED TYPE: Primary | ICD-10-CM

## 2024-01-30 LAB
ALBUMIN SERPL-MCNC: 4.2 G/DL (ref 3.5–5.2)
ALBUMIN/GLOB SERPL: 1.3 G/DL
ALP SERPL-CCNC: 75 U/L (ref 39–117)
ALT SERPL W P-5'-P-CCNC: 27 U/L (ref 1–41)
ANION GAP SERPL CALCULATED.3IONS-SCNC: 5.9 MMOL/L (ref 5–15)
AST SERPL-CCNC: 21 U/L (ref 1–40)
BASOPHILS # BLD AUTO: 0.05 10*3/MM3 (ref 0–0.2)
BASOPHILS NFR BLD AUTO: 1.1 % (ref 0–1.5)
BILIRUB SERPL-MCNC: 0.3 MG/DL (ref 0–1.2)
BUN SERPL-MCNC: 8 MG/DL (ref 6–20)
BUN/CREAT SERPL: 9.9 (ref 7–25)
CALCIUM SPEC-SCNC: 8.7 MG/DL (ref 8.6–10.5)
CHLORIDE SERPL-SCNC: 104 MMOL/L (ref 98–107)
CO2 SERPL-SCNC: 27.1 MMOL/L (ref 22–29)
CREAT SERPL-MCNC: 0.81 MG/DL (ref 0.76–1.27)
D-LACTATE SERPL-SCNC: 1.3 MMOL/L (ref 0.5–2)
DEPRECATED RDW RBC AUTO: 39.7 FL (ref 37–54)
EGFRCR SERPLBLD CKD-EPI 2021: 107.4 ML/MIN/1.73
EOSINOPHIL # BLD AUTO: 0.07 10*3/MM3 (ref 0–0.4)
EOSINOPHIL NFR BLD AUTO: 1.6 % (ref 0.3–6.2)
ERYTHROCYTE [DISTWIDTH] IN BLOOD BY AUTOMATED COUNT: 11.8 % (ref 12.3–15.4)
GEN 5 2HR TROPONIN T REFLEX: <6 NG/L
GLOBULIN UR ELPH-MCNC: 3.2 GM/DL
GLUCOSE SERPL-MCNC: 124 MG/DL (ref 65–99)
HCT VFR BLD AUTO: 43.5 % (ref 37.5–51)
HGB BLD-MCNC: 15.2 G/DL (ref 13–17.7)
HOLD SPECIMEN: NORMAL
HOLD SPECIMEN: NORMAL
IMM GRANULOCYTES # BLD AUTO: 0.01 10*3/MM3 (ref 0–0.05)
IMM GRANULOCYTES NFR BLD AUTO: 0.2 % (ref 0–0.5)
LYMPHOCYTES # BLD AUTO: 1.07 10*3/MM3 (ref 0.7–3.1)
LYMPHOCYTES NFR BLD AUTO: 24.3 % (ref 19.6–45.3)
MCH RBC QN AUTO: 31.7 PG (ref 26.6–33)
MCHC RBC AUTO-ENTMCNC: 34.9 G/DL (ref 31.5–35.7)
MCV RBC AUTO: 90.8 FL (ref 79–97)
MONOCYTES # BLD AUTO: 0.4 10*3/MM3 (ref 0.1–0.9)
MONOCYTES NFR BLD AUTO: 9.1 % (ref 5–12)
NEUTROPHILS NFR BLD AUTO: 2.8 10*3/MM3 (ref 1.7–7)
NEUTROPHILS NFR BLD AUTO: 63.7 % (ref 42.7–76)
NRBC BLD AUTO-RTO: 0 /100 WBC (ref 0–0.2)
PLATELET # BLD AUTO: 168 10*3/MM3 (ref 140–450)
PMV BLD AUTO: 9.9 FL (ref 6–12)
POTASSIUM SERPL-SCNC: 4.4 MMOL/L (ref 3.5–5.2)
PROT SERPL-MCNC: 7.4 G/DL (ref 6–8.5)
QT INTERVAL: 436 MS
QTC INTERVAL: 420 MS
RBC # BLD AUTO: 4.79 10*6/MM3 (ref 4.14–5.8)
SODIUM SERPL-SCNC: 137 MMOL/L (ref 136–145)
TROPONIN T DELTA: NORMAL
TROPONIN T SERPL HS-MCNC: <6 NG/L
WBC NRBC COR # BLD AUTO: 4.4 10*3/MM3 (ref 3.4–10.8)
WHOLE BLOOD HOLD COAG: NORMAL
WHOLE BLOOD HOLD SPECIMEN: NORMAL

## 2024-01-30 PROCEDURE — 36415 COLL VENOUS BLD VENIPUNCTURE: CPT

## 2024-01-30 PROCEDURE — 93010 ELECTROCARDIOGRAM REPORT: CPT | Performed by: INTERNAL MEDICINE

## 2024-01-30 PROCEDURE — 71045 X-RAY EXAM CHEST 1 VIEW: CPT

## 2024-01-30 PROCEDURE — 83605 ASSAY OF LACTIC ACID: CPT | Performed by: STUDENT IN AN ORGANIZED HEALTH CARE EDUCATION/TRAINING PROGRAM

## 2024-01-30 PROCEDURE — 99284 EMERGENCY DEPT VISIT MOD MDM: CPT

## 2024-01-30 PROCEDURE — 71045 X-RAY EXAM CHEST 1 VIEW: CPT | Performed by: RADIOLOGY

## 2024-01-30 PROCEDURE — 80053 COMPREHEN METABOLIC PANEL: CPT | Performed by: STUDENT IN AN ORGANIZED HEALTH CARE EDUCATION/TRAINING PROGRAM

## 2024-01-30 PROCEDURE — 93005 ELECTROCARDIOGRAM TRACING: CPT | Performed by: STUDENT IN AN ORGANIZED HEALTH CARE EDUCATION/TRAINING PROGRAM

## 2024-01-30 PROCEDURE — 84484 ASSAY OF TROPONIN QUANT: CPT | Performed by: STUDENT IN AN ORGANIZED HEALTH CARE EDUCATION/TRAINING PROGRAM

## 2024-01-30 PROCEDURE — 85025 COMPLETE CBC W/AUTO DIFF WBC: CPT | Performed by: STUDENT IN AN ORGANIZED HEALTH CARE EDUCATION/TRAINING PROGRAM

## 2024-01-30 RX ORDER — PANTOPRAZOLE SODIUM 40 MG/1
40 TABLET, DELAYED RELEASE ORAL DAILY
Qty: 30 TABLET | Refills: 0 | Status: SHIPPED | OUTPATIENT
Start: 2024-01-30

## 2024-01-30 RX ORDER — ASPIRIN 325 MG
325 TABLET ORAL ONCE
Status: COMPLETED | OUTPATIENT
Start: 2024-01-30 | End: 2024-01-30

## 2024-01-30 RX ORDER — SODIUM CHLORIDE 0.9 % (FLUSH) 0.9 %
10 SYRINGE (ML) INJECTION AS NEEDED
Status: DISCONTINUED | OUTPATIENT
Start: 2024-01-30 | End: 2024-01-30 | Stop reason: HOSPADM

## 2024-01-30 RX ORDER — NITROGLYCERIN 0.4 MG/1
0.4 TABLET SUBLINGUAL
Qty: 30 TABLET | Refills: 12 | Status: SHIPPED | OUTPATIENT
Start: 2024-01-30

## 2024-01-30 RX ADMIN — ASPIRIN 325 MG: 325 TABLET ORAL at 09:07

## 2024-01-30 NOTE — ED PROVIDER NOTES
Subjective   History of Present Illness  Patient is a 48-year-old male with history significant for hypertension who comes to the ER with complaints of chest pain.  Patient was brushing his teeth this morning when he felt a sharp pain in the sternum.  While he was coming to the ER, he felt like his right hand felt tingly.  He says he had an episode similar to this 3 years ago and follows with Dr. Cook outpatient.  He did take 2 nitroglycerin which did not help.  His pain did not get worse on exertion.  He had no nausea.  No diaphoresis or clamminess.  Given his family history, this scared him which prompted his presentation to the ER.  Upon coming to the ER, he had complete resolution of chest pain as he was walking in and is currently asymptomatic.  At the time prior when he had the similar symptoms, he had an echo and a stress test which was unremarkable.          Review of Systems   Constitutional:  Negative for chills, fatigue and fever.   HENT:  Negative for congestion, sinus pain and sore throat.    Respiratory:  Negative for cough, chest tightness, shortness of breath and wheezing.    Cardiovascular:  Positive for chest pain. Negative for palpitations and leg swelling.   Gastrointestinal:  Negative for abdominal pain, constipation, diarrhea, nausea and vomiting.   Genitourinary:  Negative for dysuria, frequency, hematuria and urgency.   Musculoskeletal:  Negative for arthralgias and myalgias.   Neurological:  Negative for dizziness, numbness and headaches.   Psychiatric/Behavioral:  Negative for confusion.        Past Medical History:   Diagnosis Date    Hypertension        No Known Allergies    Past Surgical History:   Procedure Laterality Date    ANAL FISSURECTOMY      COLONOSCOPY      KNEE SURGERY         Family History   Problem Relation Age of Onset    Heart attack Father     Heart disease Father        Social History     Socioeconomic History    Marital status:    Tobacco Use    Smoking status:  Never    Smokeless tobacco: Current     Types: Chew   Vaping Use    Vaping Use: Never used   Substance and Sexual Activity    Alcohol use: No    Drug use: No    Sexual activity: Yes     Partners: Female     Birth control/protection: None           Objective   Physical Exam  Vitals and nursing note reviewed.   Constitutional:       General: He is not in acute distress.     Appearance: He is well-developed and normal weight. He is not ill-appearing.   HENT:      Head: Normocephalic.      Mouth/Throat:      Mouth: Mucous membranes are moist.      Pharynx: Oropharynx is clear.   Eyes:      Extraocular Movements: Extraocular movements intact.      Pupils: Pupils are equal, round, and reactive to light.   Neck:      Vascular: No JVD.   Cardiovascular:      Rate and Rhythm: Normal rate and regular rhythm.      Heart sounds: No murmur heard.     No friction rub. No gallop.   Pulmonary:      Effort: Pulmonary effort is normal. No tachypnea.      Breath sounds: Normal breath sounds. No decreased breath sounds, wheezing, rhonchi or rales.   Abdominal:      General: Bowel sounds are normal.      Palpations: Abdomen is soft.   Musculoskeletal:         General: Normal range of motion.      Cervical back: Normal range of motion and neck supple.      Right lower leg: No edema.      Left lower leg: No edema.   Skin:     General: Skin is warm and dry.      Capillary Refill: Capillary refill takes less than 2 seconds.   Neurological:      General: No focal deficit present.      Mental Status: He is alert and oriented to person, place, and time.   Psychiatric:         Mood and Affect: Mood normal.         Behavior: Behavior normal.         Procedures           ED Course  ED Course as of 01/30/24 1141   Tue Jan 30, 2024   0842 ECG 12 Lead ED Triage Standing Order; Chest Pain  Sinus bradycardia, rate 56, QTc 420, no acute ST or T wave changes [CW]      ED Course User Index  [CW] Tres Wolf DO                HEART Score:  2                              Medical Decision Making  --Patient is hemodynamically stable  --EKG nonischemic  --Troponins initial undetectable, repeat undetectable  --Provided reassurance, given stress test and echo unremarkable in the past 3 years, and his clinical presentation, suspect this is noncardiac and ACS has been ruled out. Gave him the option for a stress test this morning but he stated he would rather follow-up with Dr. Cook's office as he has an appointment coming up soon  --Will start on PPI for noncardiac chest pain, follow-up with PCP and cards    Problems Addressed:  Chest pain, unspecified type: complicated acute illness or injury    Amount and/or Complexity of Data Reviewed  Labs: ordered.  Radiology: ordered.  ECG/medicine tests: ordered. Decision-making details documented in ED Course.    Risk  OTC drugs.  Prescription drug management.        Final diagnoses:   Chest pain, unspecified type       ED Disposition  ED Disposition       ED Disposition   Discharge    Condition   Stable    Comment   --               Mike Staley MD  [address] [place] [address] [address] [place] [address] [phone]    In 1 week      Collin Cook MD  [address] [place] [id number] [phone]    Go to   as scheduled         Medication List        New Prescriptions      pantoprazole 40 MG EC tablet  Commonly known as: Protonix  Take 1 tablet by mouth Daily.            Changed      * nitroglycerin 0.4 MG SL tablet  Commonly known as: NITROSTAT  1 under the tongue as needed for angina, may repeat q5mins for up three doses  What changed: Another medication with the same name was added. Make sure you understand how and when to take each.     * nitroglycerin 0.4 MG SL tablet  Commonly known as: NITROSTAT  Place 1 tablet under the tongue Every 5 (Five) Minutes As Needed for Chest Pain. Take no more than 3 doses in 15 minutes.  What changed: You were already taking a medication with the same name, and this  prescription was added. Make sure you understand how and when to take each.           * This list has 2 medication(s) that are the same as other medications prescribed for you. Read the directions carefully, and ask your doctor or other care provider to review them with you.                   Where to Get Your Medications        These medications were sent to [facility] - [place], [place] - [address] - [phone]  - [phone] [address] [address]      Phone: [phone]   nitroglycerin 0.4 MG SL tablet  pantoprazole 40 MG EC tablet            Tres Wolf,   01/30/24 1733     [Alert] : alert [No Acute Distress] : no acute distress [Normocephalic] : normocephalic [Anterior [place] Closed] : anterior fontanelle closed [Red Reflex Bilateral] : red reflex bilateral [PERRL] : PERRL [Auricles Well Formed] : auricles well formed [Normally Placed Ears] : normally placed ears [Clear Tympanic membranes with present light reflex and bony landmarks] : clear tympanic membranes with present light reflex and bony landmarks [No Discharge] : no discharge [Palate Intact] : palate intact [Nares Patent] : nares patent [Uvula Midline] : uvula midline [Supple, full passive range of motion] : supple, full passive range of motion [Tooth Eruption] : tooth eruption  [No Palpable Masses] : no palpable masses [Symmetric Chest Rise] : symmetric chest rise [Clear to Auscultation Bilaterally] : clear to auscultation bilaterally [S1, S2 present] : S1, S2 present [Regular Rate and Rhythm] : regular rate and rhythm [No Murmurs] : no murmurs [+2 Femoral Pulses] : +2 femoral pulses [Soft] : soft [NonTender] : non tender [Non Distended] : non distended [Normoactive Bowel Sounds] : normoactive bowel sounds [No Hepatomegaly] : no hepatomegaly [No Splenomegaly] : no splenomegaly [[place] 1] : [place] 1 [No Clitoromegaly] : no clitoromegaly [Normal Vaginal Introitus] : normal vaginal introitus [Patent] : patent [Normally Placed] : normally placed [No Abnormal Lymph Nodes Palpated] : no abnormal lymph nodes palpated [No Clavicular Crepitus] : no clavicular crepitus [Symmetric Buttocks Creases] : symmetric buttocks creases [No Spinal Dimple] : no spinal dimple [NoTuft of Hair] : no tuft of hair [Cranial Nerves Grossly Intact] : cranial nerves grossly intact [No Rash or Lesions] : no rash or lesions [de-identified] : +HABITUAL TOE WALKING ONLY WHEN BAREFOOT.

## 2024-03-06 DIAGNOSIS — I10 ESSENTIAL HYPERTENSION: ICD-10-CM

## 2024-08-13 ENCOUNTER — TELEPHONE (OUTPATIENT)
Dept: CARDIOLOGY | Facility: CLINIC | Age: 49
End: 2024-08-13
Payer: COMMERCIAL

## 2024-08-13 ENCOUNTER — OFFICE VISIT (OUTPATIENT)
Dept: CARDIOLOGY | Facility: CLINIC | Age: 49
End: 2024-08-13
Payer: COMMERCIAL

## 2024-08-13 VITALS
HEIGHT: 70 IN | HEART RATE: 66 BPM | SYSTOLIC BLOOD PRESSURE: 142 MMHG | DIASTOLIC BLOOD PRESSURE: 89 MMHG | RESPIRATION RATE: 18 BRPM | WEIGHT: 224 LBS | OXYGEN SATURATION: 99 % | BODY MASS INDEX: 32.07 KG/M2

## 2024-08-13 DIAGNOSIS — I10 ESSENTIAL HYPERTENSION: Primary | ICD-10-CM

## 2024-08-13 PROCEDURE — 93000 ELECTROCARDIOGRAM COMPLETE: CPT | Performed by: NURSE PRACTITIONER

## 2024-08-13 PROCEDURE — 99214 OFFICE O/P EST MOD 30 MIN: CPT | Performed by: NURSE PRACTITIONER

## 2024-08-13 NOTE — PROGRESS NOTES
[facility]             [facility] MARITA Vasquez William Thomas, MD  Sahil Barnes  1975 08/13/2024    Patient Active Problem List   Diagnosis    Precordial pain    Essential hypertension    Epigastric pain    Nausea       Dear Mike Staley MD:    Subjective     Chief Complaint   Patient presents with    Med Management     List         HPI:     This is a 48 y.o. male with known past medical history of essential hypertension.     Sahil Barnes presents today for routine cardiology follow up.  Patient states he has been doing overall well since his last visit.  Denies any chest pain, shortness of breath or palpitations.  Was in the ER in January 2024 for chest pain but states this was secondary to GERD and has resolved.  Reports recent lab work by PCP which is not available for review today.    Diagnostic Testing  Stress echo 1/2019: Normal LV function with no evidence of ischemia     All other systems were reviewed and were negative.    Patient Active Problem List   Diagnosis    Precordial pain    Essential hypertension    Epigastric pain    Nausea       family history includes Heart attack in his father; Heart disease in his father.     reports that he has never smoked. His smokeless tobacco use includes chew. He reports that he does not drink alcohol and does not use drugs.    No Known Allergies      Current Outpatient Medications:     metoprolol tartrate (LOPRESSOR) 25 MG tablet, Take 1 tablet by mouth 2 (Two) Times a Day., Disp: 60 tablet, Rfl: 6    nitroglycerin (NITROSTAT) 0.4 MG SL tablet, 1 under the tongue as needed for angina, may repeat q5mins for up three doses, Disp: 25 tablet, Rfl: 3    nitroglycerin (NITROSTAT) 0.4 MG SL tablet, Place 1 tablet under the tongue Every 5 (Five) Minutes As Needed for Chest Pain. Take no more than 3 doses in 15 minutes., Disp: 30 tablet, Rfl: 12    pantoprazole (Protonix) 40 MG EC tablet, Take 1 tablet  by mouth Daily., Disp: 30 tablet, Rfl: 0      Physical Exam:  I have reviewed the patient's current vital signs as documented in the patient's EMR.   Vitals:    08/13/24 0827   BP: 142/89   Pulse: 66   Resp: 18   SpO2: 99%     Body mass index is 32.14 kg/m².       08/13/24 0827   Weight: 102 kg (224 lb)      Physical Exam  Constitutional:       General: He is not in acute distress.     Appearance: Normal appearance. He is well-developed.   HENT:      Head: Normocephalic and atraumatic.      Mouth/Throat:      Mouth: Mucous membranes are moist.   Eyes:      Extraocular Movements: Extraocular movements intact.      Pupils: Pupils are equal, round, and reactive to light.   Neck:      Vascular: No JVD.   Cardiovascular:      Rate and Rhythm: Normal rate and regular rhythm.      Heart sounds: Normal heart sounds. No murmur heard.     No S3 or S4 sounds.   Pulmonary:      Effort: Pulmonary effort is normal. No respiratory distress.      Breath sounds: Normal breath sounds. No wheezing.   Abdominal:      General: Bowel sounds are normal. There is no distension.      Palpations: Abdomen is soft. There is no hepatomegaly.      Tenderness: There is no abdominal tenderness.   Musculoskeletal:         General: Normal range of motion.      Cervical back: Normal range of motion and neck supple.   Skin:     General: Skin is warm and dry.      Coloration: Skin is not jaundiced or pale.   Neurological:      General: No focal deficit present.      Mental Status: He is alert and oriented to person, place, and time. Mental status is at baseline.   Psychiatric:         Mood and Affect: Mood normal.         Behavior: Behavior normal.         Thought Content: Thought content normal.         Judgment: Judgment normal.            DATA REVIEWED:     TTE/YAMINI:  Results for orders placed during the hospital encounter of 01/29/19    Adult Stress Echo W/ Cont or Stress Agent if Necessary Per Protocol    Interpretation Summary  · Echocardiogram  Findings:  · Normal left ventricular cavity size and wall thickness noted. All left ventricular wall segments contract normally.  · Estimated EF appears to be in the range of 61 - 65%  · The aortic valve is structurally normal. No aortic valve regurgitation is present. No aortic valve stenosis is present.  · The mitral valve is normal in structure. Mild mitral valve regurgitation is present. No significant mitral valve stenosis is present.  · The tricuspid valve is normal. No tricuspid valve stenosis is present. Mild tricuspid valve regurgitation is present. Estimated right ventricular systolic pressure from tricuspid regurgitation is normal (<35 mmHg).  · There is no evidence of pericardial effusion.  · Stress Procedure:  · A stress test was performed following the Allan protocol.  · Exercise duration (min) 10 min Exercise duration (sec) 0 sec Estimated workload 12.8 METS  · Baseline Vitals Baseline HR 76 bpm Baseline /96 mmHg Peak Stress Vitals Peak  bpm Peak /68 mmHg Recovery Vitals Recovery HR 89 bpm Recovery /64 mmHg Exercise Data Target HR (85%) 149 bpm Max. Pred. HR (100%) 175 bpm Percent Max Pred HR 90.86 %  · There was no ST segment deviation noted during stress.  · Arrhythmias during stress: occasional PVCs.  · Normal ECG stress ECG interpretation.  · Stress Echo Findings:  · Segment augmentation had a normal response to stress.  · Cavity size behaved normally in response to stress. Left ventricular function is normal. Septal wall motion is normal.  · Post stress images with adequate visualization of myocardium to assess for ischemia  · Normal stress echo with no significant echocardiographic evidence for myocardial ischemia.      Laboratory evaluations:    Lab Results   Component Value Date    GLUCOSE 124 (H) 01/30/2024    BUN 8 01/30/2024    CREATININE 0.81 01/30/2024    EGFRIFNONA 98 07/22/2021    BCR 9.9 01/30/2024    K 4.4 01/30/2024    CO2 27.1 01/30/2024    CALCIUM 8.7  "01/30/2024    ALBUMIN 4.2 01/30/2024    AST 21 01/30/2024    ALT 27 01/30/2024     Lab Results   Component Value Date    WBC 4.40 01/30/2024    HGB 15.2 01/30/2024    HCT 43.5 01/30/2024    MCV 90.8 01/30/2024     01/30/2024     No results found for: \"CHOL\", \"CHLPL\", \"TRIG\", \"HDL\", \"LDL\", \"LDLDIRECT\"  No results found for: \"TSH\", \"W9QXMCR\", \"H6IJHTJ\", \"THYROIDAB\"  No results found for: \"HGBA1C\"  Lab Results   Component Value Date    ALT 27 01/30/2024     No results found for: \"HGBA1C\"  Lab Results   Component Value Date    CREATININE 0.81 01/30/2024     No results found for: \"IRON\", \"TIBC\", \"FERRITIN\"  No results found for: \"INR\", \"PROTIME\"        ECG 12 Lead    Date/Time: 8/13/2024 8:53 AM  Performed by: Lucille Vasquez APRN    Authorized by: Lucille Vasquez APRN  Comparison: compared with previous ECG   Rhythm: sinus bradycardia  Rate: bradycardic  Other findings: non-specific ST-T wave changes    Clinical impression: non-specific ECG         --------------------------------------------------------------------------------------------------------------------------    ASSESSMENT/PLAN:      Diagnosis Plan   1. Essential hypertension  metoprolol tartrate (LOPRESSOR) 25 MG tablet    Lipid Panel        Hypertension  Blood pressure mildly elevated in the office today however states at home it is well-controlled.  Continue with metoprolol.  Labs requested from PCP.  Lipid panel ordered.      This document has been @Electronically signed by MARITA Sanchez, 08/13/24, 8:14 AM EDT.       Dictated Utilizing Dragon Dictation: Part of this note may be an electronic transcription/translation of spoken language to printed text using the Dragon Dictation System.    Follow-up appointment and medication changes provided in hand delivered After Visit Summary as well as reviewed in the room.     "

## 2024-08-13 NOTE — TELEPHONE ENCOUNTER
No recent labs. Last labs were 07/2023 and they are in his chart.       ----- Message from Lucille Vasquez sent at 8/13/2024  8:41 AM EDT -----  Please request labs from PCP

## 2024-08-13 NOTE — TELEPHONE ENCOUNTER
Hub to relay.     Called pt to advise him that [facility] has ordered a lipid panel on him to have done. No answer LM. He will need to fast.

## 2024-08-15 NOTE — TELEPHONE ENCOUNTER
Hub to relay.     Called pt to advise him that [facility] has ordered a lipid panel on him to have done. No answer LM. He will need to fast.

## 2025-02-12 ENCOUNTER — OFFICE VISIT (OUTPATIENT)
Dept: GASTROENTEROLOGY | Facility: CLINIC | Age: 50
End: 2025-02-12
Payer: COMMERCIAL

## 2025-02-12 VITALS
SYSTOLIC BLOOD PRESSURE: 140 MMHG | BODY MASS INDEX: 33.07 KG/M2 | DIASTOLIC BLOOD PRESSURE: 83 MMHG | WEIGHT: 231 LBS | HEART RATE: 66 BPM | HEIGHT: 70 IN

## 2025-02-12 DIAGNOSIS — Z12.11 ENCOUNTER FOR SCREENING FOR MALIGNANT NEOPLASM OF COLON: ICD-10-CM

## 2025-02-12 DIAGNOSIS — Z83.719 FAMILY HISTORY OF POLYPS IN THE COLON: ICD-10-CM

## 2025-02-12 DIAGNOSIS — K57.92 DIVERTICULITIS: Primary | ICD-10-CM

## 2025-02-12 RX ORDER — CALCIUM POLYCARBOPHIL 625 MG
625 TABLET ORAL 2 TIMES DAILY
Qty: 60 TABLET | Refills: 3 | Status: SHIPPED | OUTPATIENT
Start: 2025-02-12

## 2025-02-12 RX ORDER — CETIRIZINE HYDROCHLORIDE 10 MG/1
10 TABLET ORAL DAILY
COMMUNITY

## 2025-02-12 RX ORDER — SODIUM, POTASSIUM,MAG SULFATES 17.5-3.13G
1 SOLUTION, RECONSTITUTED, ORAL ORAL TAKE AS DIRECTED
Qty: 354 ML | Refills: 0 | Status: SHIPPED | OUTPATIENT
Start: 2025-02-12

## 2025-02-12 RX ORDER — BISACODYL 5 MG/1
20 TABLET, DELAYED RELEASE ORAL ONCE
Qty: 4 TABLET | Refills: 0 | Status: SHIPPED | OUTPATIENT
Start: 2025-02-12 | End: 2025-02-12

## 2025-02-12 NOTE — PROGRESS NOTES
DATE OF CONSULTATION:  2/12/2025    REASON FOR REFERRAL: Recurrent diverticulitis    REFERRING PHYSICIAN:  Randy Jackson PA     CHIEF COMPLAINT:  Recurrent diverticulitis    HISTORY OF PRESENT ILLNESS:   Sahil Barnes is a very pleasant 49 y.o. male who is being seen today at the request of Randy Jackson PA  for evaluation and treatment of recurrent diverticulitis. Mr. Barnes reports he has struggled with recurrent diverticulitis for the past ~10 years.  Patient reports he will have~3-4 flares per year.  Patient says his most recent flare was in October 2024.  He states this flare was significant, requiring a couple courses of antibiotics.  Patient says Augmentin is not typically helpful.  Symptoms usually resolve with course of Cipro and Flagyl.  Patient reports taking Metamucil daily.  He reports his bowels move~2 times per day with stool type VI on [place] stool scale.  He has intermittent abdominal bloating and incomplete evacuation of his colon.  He denies melena, hematochezia or rectal bleeding.  Of note, he retains his gallbladder.  He reports having previous colonoscopy in~2018 with Dr. Stafford which was unremarkable.  He denies family history of colon cancer.  However, he reports his father has history of diverticulitis as well as colon polyps.  He currently denies having left lower quadrant abdominal pain.  He denies having any UGI symptoms.  He has no other complaints today.    PAST MEDICAL HISTORY:  Past Medical History:   Diagnosis Date    Hypertension        PAST SURGICAL HISTORY:  Past Surgical History:   Procedure Laterality Date    ANAL FISSURECTOMY      COLONOSCOPY      KNEE SURGERY         FAMILY HISTORY:  Family History   Problem Relation Age of Onset    Heart attack Father     Heart disease Father        SOCIAL HISTORY:  Social History     Socioeconomic History    Marital status:    Tobacco Use    Smoking status: Never    Smokeless tobacco: Current     Types: Chew   Vaping Use     "Vaping status: Never Used   Substance and Sexual Activity    Alcohol use: No    Drug use: No    Sexual activity: Yes     Partners: Female     Birth control/protection: None       MEDICATIONS:  The current medication list was reviewed in the EMR    Current Outpatient Medications:     metoprolol tartrate (LOPRESSOR) 25 MG tablet, Take 1 tablet by mouth 2 (Two) Times a Day., Disp: 60 tablet, Rfl: 6    nitroglycerin (NITROSTAT) 0.4 MG SL tablet, 1 under the tongue as needed for angina, may repeat q5mins for up three doses, Disp: 25 tablet, Rfl: 3    nitroglycerin (NITROSTAT) 0.4 MG SL tablet, Place 1 tablet under the tongue Every 5 (Five) Minutes As Needed for Chest Pain. Take no more than 3 doses in 15 minutes., Disp: 30 tablet, Rfl: 12    pantoprazole (Protonix) 40 MG EC tablet, Take 1 tablet by mouth Daily., Disp: 30 tablet, Rfl: 0    ALLERGIES:  No Known Allergies      REVIEW OF SYSTEMS:    A comprehensive 14 point review of systems was performed.  Significant findings as mentioned above.  All other systems reviewed and are negative.      Physical Exam   Vital Signs: /83 (BP Location: Left arm, Patient Position: Sitting, Cuff Size: Large Adult)   Pulse 66   Ht 177.8 cm (70\")   Wt 105 kg (231 lb)   BMI 33.15 kg/m²    General: Well developed, well nourished, alert and oriented x 3, in no acute distress.   Head: ATNC   Eyes: PERRL, No evidence of conjunctivitis.   Nose: No nasal discharge.   Mouth: Oral mucosal membranes moist. No oral ulceration or hemorrhages.   Neck: Neck supple. No thyromegaly. No JVD.   Lungs: Clear in all fields to A&P without rales, rhonchi or wheezing.   Heart: Regular rate and rhythm. No murmurs, rubs, or gallops.   Abdomen: Soft. Bowel sounds are normoactive. Nontender with palpation.   Extremities: No cyanosis or edema.   Neurologic: Grossly non-focal exam    RECENT LABS:  Lab Results   Component Value Date    WBC 4.40 01/30/2024    HGB 15.2 01/30/2024    HCT 43.5 01/30/2024    MCV " 90.8 01/30/2024    RDW 11.8 (L) 01/30/2024     01/30/2024    NEUTRORELPCT 63.7 01/30/2024    LYMPHORELPCT 24.3 01/30/2024    MONORELPCT 9.1 01/30/2024    EOSRELPCT 1.6 01/30/2024    BASORELPCT 1.1 01/30/2024    NEUTROABS 2.80 01/30/2024    LYMPHSABS 1.07 01/30/2024       Lab Results   Component Value Date     01/30/2024    K 4.4 01/30/2024    CO2 27.1 01/30/2024     01/30/2024    BUN 8 01/30/2024    CREATININE 0.81 01/30/2024    EGFRIFNONA 98 07/22/2021    GLUCOSE 124 (H) 01/30/2024    CALCIUM 8.7 01/30/2024    ALKPHOS 75 01/30/2024    AST 21 01/30/2024    ALT 27 01/30/2024    BILITOT 0.3 01/30/2024    ALBUMIN 4.2 01/30/2024    PROTEINTOT 7.4 01/30/2024     ASSESSMENT & PLAN:  Sahil Barnes is a very pleasant 49 y.o. male with    1.  Recurrent diverticulitis:  2.  Family history of colon polyps (father):    -Recommended high fiber diet to help promote bowel regularity and reduce pressure in colon.  Also recommended daily fiber supplement.  He has previously been taking Metamucil.  Based on current history above, recommended to hold Metamucil and start trial of FiberCon.  Rx provided.  Recommended he push oral hydration with water to help prevent constipation. He can also take miralax as needed if constipation should occur to help prevent straining which can exacerbate diverticular disease. Recommended he limit red meat and processed floods to as these can contribute to inflammation.   -As above, patient previously underwent colonoscopy in~2018 with Dr. Stafford which was unremarkable.  Recommended repeat colonoscopy given significant diverticulitis flare in October 2024.  We discussed risks/benefits and patient is agreeable to proceed.  Will schedule.  -At present, patient has no concerning symptoms for diverticulitis flare.  If this continues to be a recurrent issue, could also consider adding Bentyl 20 mg p.o. twice daily prn or mesalamine 2.4 g p.o. daily which we discussed today.   -Patient will  return to clinic following colonoscopy.      The patient was in agreement with the plan and all questions were answered to his satisfaction.     Thank you so much for allowing us to participate in the care of Sahil Barnes . Please do not hesitate to contact us with any questions or concerns.         Electronically Signed by: MARITA Cantu , February 12, 2025 08:11 EST       CC:   Mike Staley MD

## 2025-04-22 ENCOUNTER — HOSPITAL ENCOUNTER (OUTPATIENT)
Facility: HOSPITAL | Age: 50
Setting detail: HOSPITAL OUTPATIENT SURGERY
Discharge: HOME | End: 2025-04-22
Attending: INTERNAL MEDICINE | Admitting: INTERNAL MEDICINE
Payer: COMMERCIAL

## 2025-04-22 ENCOUNTER — ANESTHESIA (OUTPATIENT)
Dept: PERIOP | Facility: HOSPITAL | Age: 50
End: 2025-04-22
Payer: COMMERCIAL

## 2025-04-22 ENCOUNTER — ANESTHESIA EVENT (OUTPATIENT)
Dept: PERIOP | Facility: HOSPITAL | Age: 50
End: 2025-04-22
Payer: COMMERCIAL

## 2025-04-22 VITALS
HEIGHT: 69 IN | TEMPERATURE: 97.5 F | DIASTOLIC BLOOD PRESSURE: 78 MMHG | WEIGHT: 225 LBS | SYSTOLIC BLOOD PRESSURE: 108 MMHG | HEART RATE: 87 BPM | BODY MASS INDEX: 33.33 KG/M2 | RESPIRATION RATE: 18 BRPM | OXYGEN SATURATION: 97 %

## 2025-04-22 DIAGNOSIS — Z12.11 ENCOUNTER FOR SCREENING FOR MALIGNANT NEOPLASM OF COLON: ICD-10-CM

## 2025-04-22 DIAGNOSIS — K57.92 DIVERTICULITIS: ICD-10-CM

## 2025-04-22 PROCEDURE — 45378 DIAGNOSTIC COLONOSCOPY: CPT | Performed by: INTERNAL MEDICINE

## 2025-04-22 PROCEDURE — 25010000002 LIDOCAINE PF 2% 2 % SOLUTION: Performed by: NURSE ANESTHETIST, CERTIFIED REGISTERED

## 2025-04-22 PROCEDURE — 25810000003 LACTATED RINGERS PER 1000 ML: Performed by: ANESTHESIOLOGY

## 2025-04-22 PROCEDURE — 25010000002 MIDAZOLAM PER 1 MG: Performed by: NURSE ANESTHETIST, CERTIFIED REGISTERED

## 2025-04-22 PROCEDURE — 25010000002 PROPOFOL 200 MG/20ML EMULSION: Performed by: NURSE ANESTHETIST, CERTIFIED REGISTERED

## 2025-04-22 RX ORDER — IPRATROPIUM BROMIDE AND ALBUTEROL SULFATE 2.5; .5 MG/3ML; MG/3ML
3 SOLUTION RESPIRATORY (INHALATION) ONCE AS NEEDED
Status: DISCONTINUED | OUTPATIENT
Start: 2025-04-22 | End: 2025-04-22 | Stop reason: HOSPADM

## 2025-04-22 RX ORDER — SODIUM CHLORIDE, SODIUM LACTATE, POTASSIUM CHLORIDE, CALCIUM CHLORIDE 600; 310; 30; 20 MG/100ML; MG/100ML; MG/100ML; MG/100ML
100 INJECTION, SOLUTION INTRAVENOUS ONCE AS NEEDED
Status: DISCONTINUED | OUTPATIENT
Start: 2025-04-22 | End: 2025-04-22 | Stop reason: HOSPADM

## 2025-04-22 RX ORDER — OXYCODONE AND ACETAMINOPHEN 5; 325 MG/1; MG/1
1 TABLET ORAL ONCE AS NEEDED
Status: DISCONTINUED | OUTPATIENT
Start: 2025-04-22 | End: 2025-04-22 | Stop reason: HOSPADM

## 2025-04-22 RX ORDER — SODIUM CHLORIDE 0.9 % (FLUSH) 0.9 %
10 SYRINGE (ML) INJECTION EVERY 12 HOURS SCHEDULED
Status: DISCONTINUED | OUTPATIENT
Start: 2025-04-22 | End: 2025-04-22 | Stop reason: HOSPADM

## 2025-04-22 RX ORDER — MIDAZOLAM HYDROCHLORIDE 1 MG/ML
INJECTION, SOLUTION INTRAMUSCULAR; INTRAVENOUS AS NEEDED
Status: DISCONTINUED | OUTPATIENT
Start: 2025-04-22 | End: 2025-04-22 | Stop reason: SURG

## 2025-04-22 RX ORDER — FENTANYL CITRATE 50 UG/ML
50 INJECTION, SOLUTION INTRAMUSCULAR; INTRAVENOUS
Status: DISCONTINUED | OUTPATIENT
Start: 2025-04-22 | End: 2025-04-22 | Stop reason: HOSPADM

## 2025-04-22 RX ORDER — PROPOFOL 10 MG/ML
INJECTION, EMULSION INTRAVENOUS AS NEEDED
Status: DISCONTINUED | OUTPATIENT
Start: 2025-04-22 | End: 2025-04-22 | Stop reason: SURG

## 2025-04-22 RX ORDER — MIDAZOLAM HYDROCHLORIDE 1 MG/ML
1 INJECTION, SOLUTION INTRAMUSCULAR; INTRAVENOUS
Status: DISCONTINUED | OUTPATIENT
Start: 2025-04-22 | End: 2025-04-22 | Stop reason: HOSPADM

## 2025-04-22 RX ORDER — SODIUM CHLORIDE 0.9 % (FLUSH) 0.9 %
10 SYRINGE (ML) INJECTION AS NEEDED
Status: DISCONTINUED | OUTPATIENT
Start: 2025-04-22 | End: 2025-04-22 | Stop reason: HOSPADM

## 2025-04-22 RX ORDER — ONDANSETRON 2 MG/ML
4 INJECTION INTRAMUSCULAR; INTRAVENOUS AS NEEDED
Status: DISCONTINUED | OUTPATIENT
Start: 2025-04-22 | End: 2025-04-22 | Stop reason: HOSPADM

## 2025-04-22 RX ORDER — KETOROLAC TROMETHAMINE 30 MG/ML
30 INJECTION, SOLUTION INTRAMUSCULAR; INTRAVENOUS EVERY 6 HOURS PRN
Status: DISCONTINUED | OUTPATIENT
Start: 2025-04-22 | End: 2025-04-22 | Stop reason: HOSPADM

## 2025-04-22 RX ORDER — SODIUM CHLORIDE, SODIUM LACTATE, POTASSIUM CHLORIDE, CALCIUM CHLORIDE 600; 310; 30; 20 MG/100ML; MG/100ML; MG/100ML; MG/100ML
125 INJECTION, SOLUTION INTRAVENOUS ONCE
Status: COMPLETED | OUTPATIENT
Start: 2025-04-22 | End: 2025-04-22

## 2025-04-22 RX ORDER — MEPERIDINE HYDROCHLORIDE 25 MG/ML
12.5 INJECTION INTRAMUSCULAR; INTRAVENOUS; SUBCUTANEOUS
Status: DISCONTINUED | OUTPATIENT
Start: 2025-04-22 | End: 2025-04-22 | Stop reason: HOSPADM

## 2025-04-22 RX ORDER — LIDOCAINE HYDROCHLORIDE 20 MG/ML
INJECTION, SOLUTION EPIDURAL; INFILTRATION; INTRACAUDAL; PERINEURAL AS NEEDED
Status: DISCONTINUED | OUTPATIENT
Start: 2025-04-22 | End: 2025-04-22 | Stop reason: SURG

## 2025-04-22 RX ORDER — SODIUM CHLORIDE 9 MG/ML
40 INJECTION, SOLUTION INTRAVENOUS AS NEEDED
Status: DISCONTINUED | OUTPATIENT
Start: 2025-04-22 | End: 2025-04-22 | Stop reason: HOSPADM

## 2025-04-22 RX ADMIN — PROPOFOL 50 MG: 10 INJECTION, EMULSION INTRAVENOUS at 11:19

## 2025-04-22 RX ADMIN — SODIUM CHLORIDE, POTASSIUM CHLORIDE, SODIUM LACTATE AND CALCIUM CHLORIDE: 600; 310; 30; 20 INJECTION, SOLUTION INTRAVENOUS at 11:15

## 2025-04-22 RX ADMIN — PROPOFOL 100 MG: 10 INJECTION, EMULSION INTRAVENOUS at 11:17

## 2025-04-22 RX ADMIN — PROPOFOL 50 MG: 10 INJECTION, EMULSION INTRAVENOUS at 11:24

## 2025-04-22 RX ADMIN — PROPOFOL 50 MG: 10 INJECTION, EMULSION INTRAVENOUS at 11:27

## 2025-04-22 RX ADMIN — PROPOFOL 50 MG: 10 INJECTION, EMULSION INTRAVENOUS at 11:21

## 2025-04-22 RX ADMIN — MIDAZOLAM HYDROCHLORIDE 2 MG: 1 INJECTION, SOLUTION INTRAMUSCULAR; INTRAVENOUS at 11:15

## 2025-04-22 RX ADMIN — LIDOCAINE HYDROCHLORIDE 60 MG: 20 INJECTION, SOLUTION EPIDURAL; INFILTRATION; INTRACAUDAL; PERINEURAL at 11:17

## 2025-04-22 NOTE — ANESTHESIA PREPROCEDURE EVALUATION
Anesthesia Evaluation     Patient summary reviewed and Nursing notes reviewed   no history of anesthetic complications:   NPO Solid Status: > 8 hours  NPO Liquid Status: > 8 hours           Airway   Mallampati: II  TM distance: >3 FB  Neck ROM: full  No difficulty expected  Dental - normal exam     Pulmonary - negative pulmonary ROS and normal exam    breath sounds clear to auscultation  Cardiovascular - negative cardio ROS and normal exam    Patient on routine beta blocker and Beta blocker given within 24 hours of surgery  Rhythm: regular  Rate: normal    (+) hypertension well controlled less than 2 medications      Neuro/Psych- negative ROS  GI/Hepatic/Renal/Endo - negative ROS     Musculoskeletal (-) negative ROS    Abdominal  - normal exam   Substance History - negative use     OB/GYN negative ob/gyn ROS         Other - negative ROS                         Anesthesia Plan    ASA 2     general   total IV anesthesia  intravenous induction     Anesthetic plan, risks, benefits, and alternatives have been provided, discussed and informed consent has been obtained with: patient.    Use of blood products discussed with patient  Consented to blood products.        CODE STATUS:

## 2025-04-22 NOTE — ANESTHESIA POSTPROCEDURE EVALUATION
Patient: Sahil Barnes    Procedure Summary       Date: 04/22/25 Room / Location:  [facility]  /  [facility]    Anesthesia Start: 1115 Anesthesia Stop: 1131    Procedure: COLONOSCOPY Diagnosis:       Diverticulitis      Encounter for screening for malignant neoplasm of colon      (Diverticulitis [K57.92])      (Encounter for screening for malignant neoplasm of colon [Z12.11])    Surgeons: Catina Fitzpatrick MD Provider: Ryan Dodson MD    Anesthesia Type: general ASA Status: 2            Anesthesia Type: general    Vitals  Vitals Value Taken Time   /78 04/22/25 11:50   Temp 97.5 °F (36.4 °C) 04/22/25 11:33   Pulse 72 04/22/25 11:56   Resp 18 04/22/25 11:50   SpO2 95 % 04/22/25 11:56   Vitals shown include unfiled device data.        Post Anesthesia Care and Evaluation    Patient location during evaluation: PHASE II  Patient participation: complete - patient participated  Level of consciousness: awake and alert  Pain score: 0  Pain management: adequate    Airway patency: patent  Anesthetic complications: No anesthetic complications    Cardiovascular status: acceptable  Respiratory status: acceptable  Hydration status: acceptable

## 2025-04-22 NOTE — H&P
[facility]    Patient Identification:  Name:  Sahil Barnes  Age:  49 y.o.  Sex:  male  :  1975  MRN:  [id number]   Visit Number:  [id number]  Primary Care Physician:  Mike Staley MD       Chief complaint: Recurrent diverticulitis , family history of colon polyps, father    History of presenting illness:  Mr. Barnes is a 49 y.o. male who presents today for colonoscopy. Mr. Barnes reports he has struggled with recurrent diverticulitis for the past ~10 years.  Patient reports he will have~3-4 flares per year.  Patient says his most recent flare was in 2024.  He states this flare was significant, requiring a couple courses of antibiotics.  Patient says Augmentin is not typically helpful.  Symptoms usually resolve with course of Cipro and Flagyl.  Patient reports taking Metamucil daily.  He reports his bowels move~2 times per day with stool type VI on [place] stool scale.  He has intermittent abdominal bloating and incomplete evacuation of his colon.  He denies melena, hematochezia or rectal bleeding.  Of note, he retains his gallbladder.  He reports having previous colonoscopy in~ with Dr. Stafford which was unremarkable.  He denies family history of colon cancer.  However, he reports his father has history of diverticulitis as well as colon polyps.     Review of Systems   Constitutional: Negative.    HENT: Negative.     Eyes: Negative.    Respiratory: Negative.     Cardiovascular: Negative.    Gastrointestinal: Negative.    Endocrine: Negative.    Genitourinary: Negative.    Musculoskeletal: Negative.    Allergic/Immunologic: Negative.    Neurological: Negative.    Hematological: Negative.         Past Medical History:   Diagnosis Date    Diverticulitis of colon     Hypertension      Past Surgical History:   Procedure Laterality Date    ANAL FISSURECTOMY      COLONOSCOPY      KNEE SURGERY       Family History   Problem Relation Age of  "Onset    No Known Problems Mother     Heart attack Father     Heart disease Father     No Known Problems Sister     No Known Problems Brother     No Known Problems Maternal Aunt     No Known Problems Maternal Uncle     No Known Problems Paternal Aunt     No Known Problems Paternal Uncle     No Known Problems Maternal Grandmother     No Known Problems Maternal Grandfather     No Known Problems Paternal Grandmother     No Known Problems Paternal Grandfather     No Known Problems Other      Social History     Socioeconomic History    Marital status:    Tobacco Use    Smoking status: Never    Smokeless tobacco: Current     Types: Chew   Vaping Use    Vaping status: Never Used   Substance and Sexual Activity    Alcohol use: No    Drug use: No    Sexual activity: Yes     Partners: Female     Birth control/protection: None       Allergies:  Patient has no known allergies.    Prior to Admission Medications       Prescriptions Last Dose Informant Patient Reported? Taking?    calcium polycarbophil (FiberCon) 625 MG tablet   No No    Take 1 tablet by mouth 2 (Two) Times a Day.    cetirizine (zyrTEC) 10 MG tablet   Yes No    Take 1 tablet by mouth Daily.    metoprolol tartrate (LOPRESSOR) 25 MG tablet   No No    Take 1 tablet by mouth 2 (Two) Times a Day.    nitroglycerin (NITROSTAT) 0.4 MG SL tablet   No No    Place 1 tablet under the tongue Every 5 (Five) Minutes As Needed for Chest Pain. Take no more than 3 doses in 15 minutes.    sodium-potassium-magnesium sulfates (SUPREP) 17.5-3.13-1.6 GM/177ML solution oral solution   No No    Take 1 bottle by mouth Take As Directed for 2 doses. Take one bottle with 16oz of water. Then within the hour drink an additional 32oz of water.            Vital Signs:     Vitals:    04/22/25 1050   BP: 141/89   BP Location: Left arm   Patient Position: Lying   Pulse: 75   Resp: 18   Temp: 97.2 °F (36.2 °C)   TempSrc: Temporal   SpO2: 100%   Weight: 102 kg (225 lb)   Height: 175.3 cm (69\") "      Body mass index is 33.23 kg/m².    Physical Exam:  Constitutional:  Alert and oriented. Well developed and well nourished, in no acute distress.  HENT:  Head: Normocephalic and atraumatic.  Mouth:  Moist mucous membranes.  OP clear, mmm  Eyes:  Conjunctivae and EOM are normal.  Pupils are equal, round, and reactive to light.  No scleral icterus.  Neck:  Neck supple.  No JVD present.    Cardiovascular:  RRR, no MRG.  Pulmonary/Chest:  CTAB, unlabored.   Abdominal:  Soft.  Bowel sounds are normal.  No distension and no tenderness.   Musculoskeletal:  No edema, no tenderness, and no deformity.   Neurological:  MS as above, grossly nonfocal exam       Assessment and Plan:  Proceed with colonoscopy for evaluation given recurrent diverticulitis and family history of colon polyps (father).     Amee Joshi, MARITA  04/22/25  10:42 EDT

## 2025-05-27 NOTE — PROGRESS NOTES
DATE:  5/28/2025    REASON FOR FOLLOW UP: Recurrent diverticulitis    REFERRING PHYSICIAN:  Randy Jackson PA     CHIEF COMPLAINT:  Follow-up colonoscopy    HISTORY OF PRESENT ILLNESS:   Sahil Barnes is a very pleasant 49 y.o. male who is being seen today at the request of Randy Jackson PA  for evaluation and treatment of recurrent diverticulitis. Mr. Barnes reports he has struggled with recurrent diverticulitis for the past ~10 years.  Patient reports he will have~3-4 flares per year.  Patient says his most recent flare was in October 2024.  He states this flare was significant, requiring a couple courses of antibiotics.  Patient says Augmentin is not typically helpful.  Symptoms usually resolve with course of Cipro and Flagyl.  Patient reports taking Metamucil daily.  He reports his bowels move~2 times per day with stool type VI on Wittmann stool scale.  He has intermittent abdominal bloating and incomplete evacuation of his colon.  He denies melena, hematochezia or rectal bleeding.  Of note, he retains his gallbladder.  He reports having previous colonoscopy in~2018 with Dr. Stafford which was unremarkable.  He denies family history of colon cancer.  However, he reports his father has history of diverticulitis as well as colon polyps.  He currently denies having left lower quadrant abdominal pain.  He denies having any UGI symptoms.  He has no other complaints today.    INTERVAL HISTORY:  Mr. Barnes presents today for follow up.  Since his last visit, he underwent repeat colonoscopy Dr. Hess on 4/22/2025.  Preparation of the colon was fair.  He was found to have diverticulosis in the sigmoid colon, in the descending colon and in the transverse colon along with internal hemorrhoids.  Repeat colonoscopy was recommended in 5 years for surveillance.  He was previously given trial of FiberCon which he could not tolerate.  Therefore, he resumed Metamucil daily and also been taking MiraLAX daily as advised with  improvement in his bowel habits.  He is currently having 2-3 BMs per day with stool type IV-VI on [place] stool scale.  He feels as if he is evacuating his colon well.  Happily, he has not had recent diverticulitis flare.  He has no new complaints today.    PAST MEDICAL HISTORY:  Past Medical History:   Diagnosis Date    Diverticulitis of colon     Hypertension        PAST SURGICAL HISTORY:  Past Surgical History:   Procedure Laterality Date    ANAL FISSURECTOMY      COLONOSCOPY      COLONOSCOPY N/A 4/22/2025    Procedure: COLONOSCOPY;  Surgeon: Catina Fitzpatrick MD;  Location: [facility];  Service: Gastroenterology;  Laterality: N/A;    KNEE SURGERY         FAMILY HISTORY:  Family History   Problem Relation Age of Onset    No Known Problems Mother     Heart attack Father     Heart disease Father     No Known Problems Sister     No Known Problems Brother     No Known Problems Maternal Aunt     No Known Problems Maternal Uncle     No Known Problems Paternal Aunt     No Known Problems Paternal Uncle     No Known Problems Maternal Grandmother     No Known Problems Maternal Grandfather     No Known Problems Paternal Grandmother     No Known Problems Paternal Grandfather     No Known Problems Other        SOCIAL HISTORY:  Social History     Socioeconomic History    Marital status:    Tobacco Use    Smoking status: Never    Smokeless tobacco: Current     Types: Chew   Vaping Use    Vaping status: Never Used   Substance and Sexual Activity    Alcohol use: No    Drug use: No    Sexual activity: Yes     Partners: Female     Birth control/protection: None       MEDICATIONS:  The current medication list was reviewed in the EMR    Current Outpatient Medications:     calcium polycarbophil (FiberCon) 625 MG tablet, Take 1 tablet by mouth 2 (Two) Times a Day., Disp: 60 tablet, Rfl: 3    cetirizine (zyrTEC) 10 MG tablet, Take 1 tablet by mouth Daily., Disp: , Rfl:     metoprolol tartrate (LOPRESSOR) 25 MG tablet,  "Take 1 tablet by mouth 2 (Two) Times a Day., Disp: 60 tablet, Rfl: 6    nitroglycerin (NITROSTAT) 0.4 MG SL tablet, Place 1 tablet under the tongue Every 5 (Five) Minutes As Needed for Chest Pain. Take no more than 3 doses in 15 minutes., Disp: 30 tablet, Rfl: 12    ALLERGIES:  No Known Allergies      REVIEW OF SYSTEMS:    A comprehensive 14 point review of systems was performed.  Significant findings as mentioned above.  All other systems reviewed and are negative.      Physical Exam   Vital Signs: /85 (BP Location: Left arm, Patient Position: Sitting, Cuff Size: Large Adult)   Pulse 64   Ht 175.3 cm (69\")   Wt 106 kg (233 lb)   BMI 34.41 kg/m²    General: Well developed, well nourished, alert and oriented x 3, in no acute distress.   Head: ATNC   Eyes: PERRL, No evidence of conjunctivitis.   Nose: No nasal discharge.   Mouth: Oral mucosal membranes moist. No oral ulceration or hemorrhages.   Neck: Neck supple. No thyromegaly. No JVD.   Lungs: Clear in all fields to A&P without rales, rhonchi or wheezing.   Heart: Regular rate and rhythm. No murmurs, rubs, or gallops.   Abdomen: Soft. Bowel sounds are normoactive. Nontender with palpation.   Extremities: No cyanosis or edema.   Neurologic: Grossly non-focal exam    RECENT LABS:  Lab Results   Component Value Date    WBC 4.40 01/30/2024    HGB 15.2 01/30/2024    HCT 43.5 01/30/2024    MCV 90.8 01/30/2024    RDW 11.8 (L) 01/30/2024     01/30/2024    NEUTRORELPCT 63.7 01/30/2024    LYMPHORELPCT 24.3 01/30/2024    MONORELPCT 9.1 01/30/2024    EOSRELPCT 1.6 01/30/2024    BASORELPCT 1.1 01/30/2024    NEUTROABS 2.80 01/30/2024    LYMPHSABS 1.07 01/30/2024       Lab Results   Component Value Date     01/30/2024    K 4.4 01/30/2024    CO2 27.1 01/30/2024     01/30/2024    BUN 8 01/30/2024    CREATININE 0.81 01/30/2024    EGFRIFNONA 98 07/22/2021    GLUCOSE 124 (H) 01/30/2024    CALCIUM 8.7 01/30/2024    ALKPHOS 75 01/30/2024    AST 21 01/30/2024 "    ALT 27 01/30/2024    BILITOT 0.3 01/30/2024    ALBUMIN 4.2 01/30/2024    PROTEINTOT 7.4 01/30/2024     ASSESSMENT & PLAN:  Sahil Barnes is a very pleasant 49 y.o. male with    1.  Recurrent diverticulitis:  2.  Family history of colon polyps (father):  3.  Alternating constipation and diarrhea:    -Recommended high fiber diet to help promote bowel regularity and reduce pressure in colon.  Also recommended daily fiber supplement.  He is currently taking Metamucil daily.  Recommended he push oral hydration with water to help prevent constipation.  Recommended to continue MiraLAX daily as needed for constipation to help prevent straining which can exacerbate diverticular disease.  At present, his bowels are moving well with this regimen.  Recommended he limit red meat and processed floods to as these can contribute to inflammation.   -As above, patient previously underwent colonoscopy in~2018 with Dr. Stafford which was unremarkable.  Recommended repeat colonoscopy given significant diverticulitis flare in October 2024. He underwent repeat colonoscopy Dr. Hess on 4/22/2025.  Preparation of the colon was fair.  He was found to have diverticulosis in the sigmoid colon, in the descending colon and in the transverse colon along with internal hemorrhoids.  Repeat colonoscopy was recommended in 5 years for surveillance.  We reviewed findings/recommendations today.  -At present, patient has no concerning symptoms for diverticulitis flare.  If this continues to be a recurrent issue, could also consider adding Bentyl 20 mg p.o. twice daily prn or mesalamine 2.4 g p.o. daily.   -Patient will return to clinic in 6 months for symptom check or sooner if needed.      The patient was in agreement with the plan and all questions were answered to his satisfaction.     Thank you so much for allowing us to participate in the care of Sahil Barnes . Please do not hesitate to contact us with any questions or concerns.          Electronically Signed by: Amee Joshi, MARITA , May 27, 2025 14:12 EDT       CC:   Mike Staley MD

## 2025-05-28 ENCOUNTER — OFFICE VISIT (OUTPATIENT)
Dept: GASTROENTEROLOGY | Facility: CLINIC | Age: 50
End: 2025-05-28
Payer: COMMERCIAL

## 2025-05-28 VITALS
BODY MASS INDEX: 34.51 KG/M2 | DIASTOLIC BLOOD PRESSURE: 85 MMHG | HEART RATE: 64 BPM | HEIGHT: 69 IN | WEIGHT: 233 LBS | SYSTOLIC BLOOD PRESSURE: 169 MMHG

## 2025-05-28 DIAGNOSIS — R19.8 ALTERNATING CONSTIPATION AND DIARRHEA: ICD-10-CM

## 2025-05-28 DIAGNOSIS — K57.92 DIVERTICULITIS: Primary | ICD-10-CM

## 2025-05-28 DIAGNOSIS — Z83.719 FAMILY HISTORY OF POLYPS IN THE COLON: ICD-10-CM

## 2025-05-28 PROCEDURE — 99214 OFFICE O/P EST MOD 30 MIN: CPT | Performed by: NURSE PRACTITIONER

## 2025-07-17 ENCOUNTER — TRANSCRIBE ORDERS (OUTPATIENT)
Dept: ADMINISTRATIVE | Facility: HOSPITAL | Age: 50
End: 2025-07-17
Payer: COMMERCIAL

## 2025-07-17 DIAGNOSIS — M25.562 LEFT KNEE PAIN, UNSPECIFIED CHRONICITY: Primary | ICD-10-CM

## 2025-08-01 ENCOUNTER — HOSPITAL ENCOUNTER (OUTPATIENT)
Dept: MRI IMAGING | Facility: HOSPITAL | Age: 50
Discharge: HOME | End: 2025-08-01
Admitting: FAMILY MEDICINE
Payer: COMMERCIAL

## 2025-08-01 DIAGNOSIS — M25.562 LEFT KNEE PAIN, UNSPECIFIED CHRONICITY: ICD-10-CM

## 2025-08-01 PROCEDURE — 73721 MRI JNT OF LWR EXTRE W/O DYE: CPT

## 2025-08-06 ENCOUNTER — TELEPHONE (OUTPATIENT)
Dept: CARDIOLOGY | Facility: CLINIC | Age: 50
End: 2025-08-06
Payer: COMMERCIAL

## 2025-08-08 ENCOUNTER — TELEPHONE (OUTPATIENT)
Dept: CARDIOLOGY | Facility: CLINIC | Age: 50
End: 2025-08-08
Payer: COMMERCIAL

## 2025-08-13 ENCOUNTER — OFFICE VISIT (OUTPATIENT)
Dept: CARDIOLOGY | Facility: CLINIC | Age: 50
End: 2025-08-13
Payer: COMMERCIAL

## 2025-08-13 ENCOUNTER — TELEPHONE (OUTPATIENT)
Dept: CARDIOLOGY | Facility: CLINIC | Age: 50
End: 2025-08-13
Payer: COMMERCIAL

## 2025-08-13 VITALS
SYSTOLIC BLOOD PRESSURE: 146 MMHG | HEART RATE: 60 BPM | BODY MASS INDEX: 34.45 KG/M2 | WEIGHT: 232.6 LBS | HEIGHT: 69 IN | DIASTOLIC BLOOD PRESSURE: 85 MMHG | OXYGEN SATURATION: 99 %

## 2025-08-13 DIAGNOSIS — I10 ESSENTIAL HYPERTENSION: ICD-10-CM

## 2025-08-13 PROCEDURE — 93000 ELECTROCARDIOGRAM COMPLETE: CPT | Performed by: NURSE PRACTITIONER

## 2025-08-13 PROCEDURE — 99214 OFFICE O/P EST MOD 30 MIN: CPT | Performed by: NURSE PRACTITIONER

## 2025-08-13 RX ORDER — NITROGLYCERIN 0.4 MG/1
0.4 TABLET SUBLINGUAL
Qty: 90 TABLET | Refills: 3 | Status: SHIPPED | OUTPATIENT
Start: 2025-08-13

## 2025-08-13 RX ORDER — METOPROLOL TARTRATE 25 MG/1
25 TABLET, FILM COATED ORAL 2 TIMES DAILY
Qty: 180 TABLET | Refills: 3 | Status: SHIPPED | OUTPATIENT
Start: 2025-08-13

## (undated) DEVICE — Device

## (undated) DEVICE — DEFENDO AIR WATER SUCTION AND BIOPSY VALVE KIT FOR  OLYMPUS: Brand: DEFENDO AIR/WATER/SUCTION AND BIOPSY VALVE

## (undated) DEVICE — ENDOGATOR TUBING FOR ENDOGATOR EGP-100 IRRIGATION PUMP,OLYMPUS OFP PUMP, OLYMPUS AFU-100 PUMP AND ERBE EIP2 PUMP: Brand: ENDOGATOR

## (undated) DEVICE — ENDOGATOR AUXILIARY WATER JET CONNECTOR: Brand: ENDOGATOR

## (undated) DEVICE — CONN Y IRR DISP 1P/U